# Patient Record
Sex: MALE | Race: WHITE | Employment: FULL TIME | URBAN - METROPOLITAN AREA
[De-identification: names, ages, dates, MRNs, and addresses within clinical notes are randomized per-mention and may not be internally consistent; named-entity substitution may affect disease eponyms.]

---

## 2017-09-20 ENCOUNTER — OFFICE VISIT (OUTPATIENT)
Dept: FAMILY MEDICINE CLINIC | Age: 47
End: 2017-09-20

## 2017-09-20 VITALS
HEART RATE: 78 BPM | SYSTOLIC BLOOD PRESSURE: 106 MMHG | DIASTOLIC BLOOD PRESSURE: 74 MMHG | TEMPERATURE: 97.9 F | HEIGHT: 67 IN | RESPIRATION RATE: 16 BRPM | BODY MASS INDEX: 20.09 KG/M2 | WEIGHT: 128 LBS

## 2017-09-20 DIAGNOSIS — S39.012A BACK STRAIN, INITIAL ENCOUNTER: Primary | ICD-10-CM

## 2017-09-20 PROCEDURE — G8420 CALC BMI NORM PARAMETERS: HCPCS | Performed by: FAMILY MEDICINE

## 2017-09-20 PROCEDURE — G8427 DOCREV CUR MEDS BY ELIG CLIN: HCPCS | Performed by: FAMILY MEDICINE

## 2017-09-20 PROCEDURE — 99213 OFFICE O/P EST LOW 20 MIN: CPT | Performed by: FAMILY MEDICINE

## 2017-09-20 PROCEDURE — 4004F PT TOBACCO SCREEN RCVD TLK: CPT | Performed by: FAMILY MEDICINE

## 2017-09-20 RX ORDER — CYCLOBENZAPRINE HCL 10 MG
TABLET ORAL
Qty: 30 TABLET | Refills: 1 | Status: SHIPPED | OUTPATIENT
Start: 2017-09-20

## 2017-09-20 RX ORDER — HYDROCODONE BITARTRATE AND ACETAMINOPHEN 5; 325 MG/1; MG/1
1 TABLET ORAL EVERY 4 HOURS PRN
Qty: 40 TABLET | Refills: 0 | Status: SHIPPED | OUTPATIENT
Start: 2017-09-20 | End: 2017-09-27

## 2017-09-20 RX ORDER — METHYLPREDNISOLONE 4 MG/1
TABLET ORAL
Qty: 1 KIT | Refills: 0 | Status: SHIPPED | OUTPATIENT
Start: 2017-09-20 | End: 2017-09-26

## 2017-09-20 ASSESSMENT — PATIENT HEALTH QUESTIONNAIRE - PHQ9
1. LITTLE INTEREST OR PLEASURE IN DOING THINGS: 0
2. FEELING DOWN, DEPRESSED OR HOPELESS: 0
SUM OF ALL RESPONSES TO PHQ9 QUESTIONS 1 & 2: 0
SUM OF ALL RESPONSES TO PHQ QUESTIONS 1-9: 0

## 2018-04-12 ENCOUNTER — OFFICE VISIT (OUTPATIENT)
Dept: FAMILY MEDICINE CLINIC | Age: 48
End: 2018-04-12
Payer: COMMERCIAL

## 2018-04-12 VITALS
TEMPERATURE: 97.6 F | WEIGHT: 128 LBS | RESPIRATION RATE: 20 BRPM | HEIGHT: 67 IN | SYSTOLIC BLOOD PRESSURE: 108 MMHG | DIASTOLIC BLOOD PRESSURE: 72 MMHG | HEART RATE: 65 BPM | BODY MASS INDEX: 20.09 KG/M2

## 2018-04-12 DIAGNOSIS — Z00.00 ANNUAL PHYSICAL EXAM: ICD-10-CM

## 2018-04-12 DIAGNOSIS — K21.00 GASTROESOPHAGEAL REFLUX DISEASE WITH ESOPHAGITIS: ICD-10-CM

## 2018-04-12 DIAGNOSIS — Z00.00 ANNUAL PHYSICAL EXAM: Primary | ICD-10-CM

## 2018-04-12 LAB
ALBUMIN SERPL-MCNC: 4.5 G/DL (ref 3.9–4.9)
ALP BLD-CCNC: 97 U/L (ref 35–104)
ALT SERPL-CCNC: 14 U/L (ref 0–41)
ANION GAP SERPL CALCULATED.3IONS-SCNC: 13 MEQ/L (ref 7–13)
AST SERPL-CCNC: 22 U/L (ref 0–40)
BASOPHILS ABSOLUTE: 0 K/UL (ref 0–0.2)
BASOPHILS RELATIVE PERCENT: 0.6 %
BILIRUB SERPL-MCNC: 0.2 MG/DL (ref 0–1.2)
BUN BLDV-MCNC: 16 MG/DL (ref 6–20)
CALCIUM SERPL-MCNC: 9.3 MG/DL (ref 8.6–10.2)
CHLORIDE BLD-SCNC: 102 MEQ/L (ref 98–107)
CHOLESTEROL, TOTAL: 185 MG/DL (ref 0–199)
CO2: 27 MEQ/L (ref 22–29)
CREAT SERPL-MCNC: 0.96 MG/DL (ref 0.7–1.2)
EOSINOPHILS ABSOLUTE: 0.1 K/UL (ref 0–0.7)
EOSINOPHILS RELATIVE PERCENT: 1.5 %
GFR AFRICAN AMERICAN: >60
GFR NON-AFRICAN AMERICAN: >60
GLOBULIN: 2.5 G/DL (ref 2.3–3.5)
GLUCOSE BLD-MCNC: 92 MG/DL (ref 74–109)
HCT VFR BLD CALC: 40.7 % (ref 42–52)
HDLC SERPL-MCNC: 62 MG/DL (ref 40–59)
HEMOGLOBIN: 13.8 G/DL (ref 14–18)
LDL CHOLESTEROL CALCULATED: 107 MG/DL (ref 0–129)
LYMPHOCYTES ABSOLUTE: 2.1 K/UL (ref 1–4.8)
LYMPHOCYTES RELATIVE PERCENT: 31.7 %
MCH RBC QN AUTO: 32.1 PG (ref 27–31.3)
MCHC RBC AUTO-ENTMCNC: 33.8 % (ref 33–37)
MCV RBC AUTO: 95.1 FL (ref 80–100)
MONOCYTES ABSOLUTE: 0.7 K/UL (ref 0.2–0.8)
MONOCYTES RELATIVE PERCENT: 10.2 %
NEUTROPHILS ABSOLUTE: 3.7 K/UL (ref 1.4–6.5)
NEUTROPHILS RELATIVE PERCENT: 56 %
PDW BLD-RTO: 13.6 % (ref 11.5–14.5)
PLATELET # BLD: 288 K/UL (ref 130–400)
POTASSIUM SERPL-SCNC: 4.2 MEQ/L (ref 3.5–5.1)
RBC # BLD: 4.28 M/UL (ref 4.7–6.1)
SODIUM BLD-SCNC: 142 MEQ/L (ref 132–144)
TOTAL PROTEIN: 7 G/DL (ref 6.4–8.1)
TRIGL SERPL-MCNC: 79 MG/DL (ref 0–200)
WBC # BLD: 6.5 K/UL (ref 4.8–10.8)

## 2018-04-12 PROCEDURE — 99396 PREV VISIT EST AGE 40-64: CPT | Performed by: FAMILY MEDICINE

## 2018-04-12 RX ORDER — NICOTINE 21 MG/24HR
1 PATCH, TRANSDERMAL 24 HOURS TRANSDERMAL EVERY 24 HOURS
Qty: 30 PATCH | Refills: 1 | Status: SHIPPED | OUTPATIENT
Start: 2018-04-12 | End: 2019-04-12

## 2018-07-19 ENCOUNTER — OFFICE VISIT (OUTPATIENT)
Dept: FAMILY MEDICINE CLINIC | Age: 48
End: 2018-07-19
Payer: COMMERCIAL

## 2018-07-19 VITALS
HEIGHT: 67 IN | BODY MASS INDEX: 19.46 KG/M2 | RESPIRATION RATE: 16 BRPM | SYSTOLIC BLOOD PRESSURE: 108 MMHG | HEART RATE: 78 BPM | OXYGEN SATURATION: 94 % | TEMPERATURE: 96.3 F | DIASTOLIC BLOOD PRESSURE: 62 MMHG | WEIGHT: 124 LBS

## 2018-07-19 DIAGNOSIS — R05.9 COUGH: Primary | ICD-10-CM

## 2018-07-19 PROCEDURE — G8420 CALC BMI NORM PARAMETERS: HCPCS | Performed by: NURSE PRACTITIONER

## 2018-07-19 PROCEDURE — G8427 DOCREV CUR MEDS BY ELIG CLIN: HCPCS | Performed by: NURSE PRACTITIONER

## 2018-07-19 PROCEDURE — 4004F PT TOBACCO SCREEN RCVD TLK: CPT | Performed by: NURSE PRACTITIONER

## 2018-07-19 PROCEDURE — 99213 OFFICE O/P EST LOW 20 MIN: CPT | Performed by: NURSE PRACTITIONER

## 2018-07-19 RX ORDER — BENZONATATE 100 MG/1
100 CAPSULE ORAL 3 TIMES DAILY PRN
Qty: 30 CAPSULE | Refills: 0 | Status: SHIPPED | OUTPATIENT
Start: 2018-07-19

## 2018-07-19 ASSESSMENT — ENCOUNTER SYMPTOMS
SHORTNESS OF BREATH: 0
WHEEZING: 0
DIARRHEA: 0
COUGH: 1
EYE REDNESS: 0
SINUS PRESSURE: 0
SORE THROAT: 0
NAUSEA: 0
EYE DISCHARGE: 0
SINUS PAIN: 0
EYE PAIN: 0
RHINORRHEA: 0
TROUBLE SWALLOWING: 0
CONSTIPATION: 0
EYE ITCHING: 0
CHEST TIGHTNESS: 0
VOMITING: 0

## 2018-07-19 NOTE — PROGRESS NOTES
Subjective  Office Visit  [address] [facility]  [place] [address]  Dept: [phone]  Dept Fax: [phone]  Loc: [address]  YOB: 1970  Age: 53 y.o. Sex: male  Date of Assessment:  7/19/2018  PCP: Jared Calderon DO    Chief Complaint   Patient presents with    Cough     x 2 day       HPI    C/C:Presents for evaluation of cough with phlegm production. Patient has had no known ill contacts. Relevant PMH: Asthma. Patient denies travel or residence in the [facility]. Patient denies travel or residence in [address] [place] [address] for TB. Patient denies occupational or hobby exposure to irritants such as sulfiting agents, tartrazine, epoxies, chemical, flour, wood dust.)     Onset of symptoms was 2 days ago     Denies: fever, myalgias/arthralgias, headache, ear symptoms, shortness of breath, wheezing/chest tightness, dizziness, eye symptoms, nausea/vomiting and diarrhea, history of TMJ. Symptoms are: gradually worsening since that time. Treatment to date: nothing, which has been  na. Symptoms are alleviated by nothing    Symptoms are aggravated by nothing.     Vitals    /62   Pulse 78   Temp 96.3 °F (35.7 °C) (Temporal)   Resp 16   Ht 5' 7\" (1.702 m)   Wt 124 lb (56.2 kg)   SpO2 94%   BMI 19.42 kg/m²     BP Readings from Last 3 Encounters:   07/19/18 108/62   04/12/18 108/72   09/20/17 106/74      Wt Readings from Last 3 Encounters:   07/19/18 124 lb (56.2 kg)   04/12/18 128 lb (58.1 kg)   09/20/17 128 lb (58.1 kg)          Personal and Family History    Past Medical History:   Diagnosis Date    Allergic rhinitis     Asthma     Colitis     GERD (gastroesophageal reflux disease)     Rosacea        Past Surgical History:   Procedure Laterality Date    COLONOSCOPY  2004    UPPER GASTROINTESTINAL ENDOSCOPY  2004       Family History   Problem Relation Age of Onset    High Blood Pressure Mother    [facility] Return if symptoms worsen or fail to improve. Antibiotics: To prevent antibiotic resistances please take medication as ordered and for the full duration even if you start to feel better. Avoid Alcohol      Supportive Measures  Wash hands frequently, Tylenol or Ibuprofen for discomfort or fever. For sore throat use warm salt water gargles, cough drops or chloraseptic spray. For cough add humidification to the air for dry environments. Sit in a steam shower. Add pillows underneath the mattress to help sleep with head of bed elevated decreasing night time cough and shortness of breath cause by post nasal drip. Advised to increase fluid intake and rest as tolerated. Monitor for signs of dehydration which can include dry mucous membranes, decreased urine output, sunken dark eyes. Stop smoking and avoid second hand smoke    OTC Medication:    Warning:  Use any OTC product with caution and with careful consideration of comorbidities such as diabetes and hypertension. Decongestants:    Patient was advised to avoid using medications with the initials D or DM such as Zyrtec-D for more than 4 days. After 4 days patient, should then transition to regular Zyrtec for the management of seasonal allergies. Also, avoid long term use of things such as pseudoephedrine and Afrin as they can worsen symptoms and become addictive. Patient was instructed that she should NOT be taking antihistamines with pseudoephedrine long-term. Nasal Sprays  Patient was instructed nasal sprays. Avoid with history of hypertension. Prior to using nasal medication blow nose. Advised patient to keep nose over toes to avoid foul taste, breathe out slowly squeeze the pump or press down canister as you slowly breathe through your nose use left had to spray right nares, use right hand to spray left nares, spray toward outer nares versus the septum to avoid irritation that can cause nose bleeds.  Avoid sneezing if possible or blowing

## 2023-03-06 LAB
C REACTIVE PROTEIN (MG/L) IN SER/PLAS: 0.14 MG/DL
C-REACTIVE PROTEIN: 0.14 MG/DL
DEAMIDATED GLIADIN PEPTIDE IGA: 2 U/ML (ref 0–14)
DEAMIDATED GLIADIN PEPTIDE IGG: <1 U/ML (ref 0–14)
ERYTHROCYTE DISTRIBUTION WIDTH (RATIO) BY AUTOMATED COUNT: 13.2 % (ref 11.5–14.5)
ERYTHROCYTE MEAN CORPUSCULAR HEMOGLOBIN CONCENTRATION (G/DL) BY AUTOMATED: 31.8 G/DL (ref 32–36)
ERYTHROCYTE MEAN CORPUSCULAR VOLUME (FL) BY AUTOMATED COUNT: 95 FL (ref 80–100)
ERYTHROCYTE [DISTWIDTH] IN BLOOD BY AUTOMATED COUNT: 13.2 % (ref 11.5–14)
ERYTHROCYTES (10*6/UL) IN BLOOD BY AUTOMATED COUNT: 4.18 X10E12/L (ref 4.5–5.9)
GLIADIN ANTIBODIES IGA: 2 U/ML (ref 0–14)
GLIADIN ANTIBODIES IGG: <1 U/ML (ref 0–14)
HCT VFR BLD CALC: 39.9 % (ref 41–52)
HEMATOCRIT (%) IN BLOOD BY AUTOMATED COUNT: 39.9 % (ref 41–52)
HEMOGLOBIN (G/DL) IN BLOOD: 12.7 G/DL (ref 13.5–17.5)
HEMOGLOBIN: 12.7 G/DL (ref 13.5–17)
LEUKOCYTES (10*3/UL) IN BLOOD BY AUTOMATED COUNT: 8.3 X10E9/L (ref 4.4–11.3)
MCHC RBC AUTO-ENTMCNC: 31.8 G/DL (ref 32–36)
MCV RBC AUTO: 95 FL (ref 80–100)
PLATELET # BLD: 553 X10E9/L (ref 150–450)
PLATELETS (10*3/UL) IN BLOOD AUTOMATED COUNT: 553 X10E9/L (ref 150–450)
RBC # BLD: 4.18 X10E12/L (ref 4.5–5.9)
SEDIMENTATION RATE, ERYTHROCYTE: 44 MM/H (ref 0–20)
SEDIMENTATION RATE, ERYTHROCYTE: 44 MM/H (ref 0–20)
TISSUE TRANSGLUTAMINASE ANTIBODY IGG: <1 U/ML (ref 0–14)
TISSUE TRANSGLUTAMINASE IGA: <1 U/ML (ref 0–14)
TISSUE TRANSGLUTAMINASE IGG: <1 U/ML (ref 0–14)
TISSUE TRANSGLUTAMINASE, IGA: <1 U/ML (ref 0–14)
WBC: 8.3 X10E9/L (ref 4.4–11.3)

## 2023-08-01 LAB — CARCINOEMBRYONIC AG (NG/ML) IN SER/PLAS: 1.4 UG/L

## 2023-08-22 LAB
ALANINE AMINOTRANSFERASE (SGPT) (U/L) IN SER/PLAS: 11 U/L (ref 10–52)
ALBUMIN (G/DL) IN SER/PLAS: 4.2 G/DL (ref 3.4–5)
ALKALINE PHOSPHATASE (U/L) IN SER/PLAS: 85 U/L (ref 33–120)
ANION GAP IN SER/PLAS: 10 MMOL/L (ref 10–20)
ASPARTATE AMINOTRANSFERASE (SGOT) (U/L) IN SER/PLAS: 14 U/L (ref 9–39)
BILIRUBIN TOTAL (MG/DL) IN SER/PLAS: 0.3 MG/DL (ref 0–1.2)
CALCIUM (MG/DL) IN SER/PLAS: 9.1 MG/DL (ref 8.6–10.3)
CARBON DIOXIDE, TOTAL (MMOL/L) IN SER/PLAS: 29 MMOL/L (ref 21–32)
CHLORIDE (MMOL/L) IN SER/PLAS: 108 MMOL/L (ref 98–107)
CREATININE (MG/DL) IN SER/PLAS: 0.96 MG/DL (ref 0.5–1.3)
GFR MALE: >90 ML/MIN/1.73M2
GLUCOSE (MG/DL) IN SER/PLAS: 93 MG/DL (ref 74–99)
POTASSIUM (MMOL/L) IN SER/PLAS: 4 MMOL/L (ref 3.5–5.3)
PROTEIN TOTAL: 7.2 G/DL (ref 6.4–8.2)
SODIUM (MMOL/L) IN SER/PLAS: 143 MMOL/L (ref 136–145)
UREA NITROGEN (MG/DL) IN SER/PLAS: 19 MG/DL (ref 6–23)

## 2023-10-27 PROBLEM — Z00.00 ROUTINE GENERAL MEDICAL EXAMINATION AT A HEALTH CARE FACILITY: Status: ACTIVE | Noted: 2023-10-27

## 2023-10-27 PROBLEM — R10.9 ABDOMINAL PAIN: Status: ACTIVE | Noted: 2023-10-27

## 2023-10-27 PROBLEM — L01.00 IMPETIGO: Status: ACTIVE | Noted: 2023-10-27

## 2023-10-27 PROBLEM — J01.90 ACUTE SINUSITIS: Status: ACTIVE | Noted: 2023-10-27

## 2023-10-27 NOTE — PROGRESS NOTES
"Subjective   Patient ID: Mitch Felder is a 53 y.o. male who presents for Annual Exam.  HPI  Annual physical   Eats a generally healthy diet   active  Denies any chest pain,SOB  No Abdominal pain   No black or bloody stools   Urination/BM normal   Last eye apt 3 years ago  Last dental apt 1 years ago  No new family h/o cancers or heart disease   Did not fast today for BW    Pt declined flu vaccine today    No other concern    Review of systems  ; Patient seen today for exam denies any problems with headaches or vision, denies any shortness of breath chest pain nausea or vomiting, no black stool no blood in the stool no heartburn type symptoms denies any problems with constipation or diarrhea, and no dysuria-type symptoms    The patient's allergies medications were reviewed with them today    The patient's social family and surgical history or also reviewed here today, along with her past medical history.     Objective     Alert and active in  no acute distress  HEENT TMs clear oropharynx negative nares clear no drainage noted neck supple  With no adenopathy   Heart regular rate and rhythm without murmur and no carotid bruits  Lungs- clear to auscultation bilaterally, no wheeze or rhonchi noted  Thyroid -negative masses or nodularity  Abdomen- soft times four quadrants , bowel sounds positive no masses or organomegaly, negative tenderness guarding or rebound  Neurological exam unremarkable- DTRs in upper and lower extremities within normal limits.   skin -no lesions noted      BP 94/58 (BP Location: Left arm, Patient Position: Sitting, BP Cuff Size: Adult)   Pulse 55   Temp 36.4 °C (97.5 °F) (Temporal)   Resp 16   Ht 1.702 m (5' 7\")   Wt 57.1 kg (125 lb 12.8 oz)   SpO2 98%   BMI 19.70 kg/m²     No Known Allergies    Assessment/Plan   Problem List Items Addressed This Visit       Routine general medical examination at a health care facility    Relevant Orders    Lipid Panel    BMI 20.0-20.9, adult     Other " Visit Diagnoses       Prostate cancer screening        Relevant Orders    Prostate Specific Antigen, Screen          Doing well reviewed his pathology report showing colitis he is due to see Dr. Raymond over next few weeks    Have to keep an eye on things if he has any other bouts of colitis he will need a also colitis work-up at that time with biopsies will also far nothing really showing its self      We will get his prostate and cholesterol as ordered    Discussed shingles vaccine  Deferred    If anything worsens or changes please call us at once, follow up in the office as planned,

## 2023-10-30 ENCOUNTER — OFFICE VISIT (OUTPATIENT)
Dept: PRIMARY CARE | Facility: CLINIC | Age: 53
End: 2023-10-30
Payer: COMMERCIAL

## 2023-10-30 VITALS
WEIGHT: 125.8 LBS | OXYGEN SATURATION: 98 % | SYSTOLIC BLOOD PRESSURE: 94 MMHG | HEIGHT: 67 IN | HEART RATE: 55 BPM | BODY MASS INDEX: 19.74 KG/M2 | RESPIRATION RATE: 16 BRPM | DIASTOLIC BLOOD PRESSURE: 58 MMHG | TEMPERATURE: 97.5 F

## 2023-10-30 DIAGNOSIS — Z12.5 PROSTATE CANCER SCREENING: ICD-10-CM

## 2023-10-30 DIAGNOSIS — Z00.00 ROUTINE GENERAL MEDICAL EXAMINATION AT A HEALTH CARE FACILITY: ICD-10-CM

## 2023-10-30 PROCEDURE — 99396 PREV VISIT EST AGE 40-64: CPT | Performed by: FAMILY MEDICINE

## 2023-10-30 PROCEDURE — 1036F TOBACCO NON-USER: CPT | Performed by: FAMILY MEDICINE

## 2023-10-30 PROCEDURE — 3008F BODY MASS INDEX DOCD: CPT | Performed by: FAMILY MEDICINE

## 2023-10-30 ASSESSMENT — PATIENT HEALTH QUESTIONNAIRE - PHQ9
2. FEELING DOWN, DEPRESSED OR HOPELESS: NOT AT ALL
1. LITTLE INTEREST OR PLEASURE IN DOING THINGS: NOT AT ALL
SUM OF ALL RESPONSES TO PHQ9 QUESTIONS 1 AND 2: 0

## 2023-11-16 ENCOUNTER — OFFICE VISIT (OUTPATIENT)
Dept: GASTROENTEROLOGY | Facility: CLINIC | Age: 53
End: 2023-11-16
Payer: COMMERCIAL

## 2023-11-16 ENCOUNTER — LAB (OUTPATIENT)
Dept: LAB | Facility: LAB | Age: 53
End: 2023-11-16
Payer: COMMERCIAL

## 2023-11-16 VITALS
HEART RATE: 60 BPM | HEIGHT: 67 IN | SYSTOLIC BLOOD PRESSURE: 112 MMHG | BODY MASS INDEX: 19.93 KG/M2 | WEIGHT: 127 LBS | DIASTOLIC BLOOD PRESSURE: 63 MMHG

## 2023-11-16 DIAGNOSIS — K56.699 COLON STRICTURE (MULTI): ICD-10-CM

## 2023-11-16 DIAGNOSIS — K56.699 COLON STRICTURE (MULTI): Primary | ICD-10-CM

## 2023-11-16 DIAGNOSIS — Z00.00 ROUTINE GENERAL MEDICAL EXAMINATION AT A HEALTH CARE FACILITY: ICD-10-CM

## 2023-11-16 DIAGNOSIS — Z12.5 PROSTATE CANCER SCREENING: ICD-10-CM

## 2023-11-16 LAB
CHOLEST SERPL-MCNC: 187 MG/DL (ref 0–199)
CHOLESTEROL/HDL RATIO: 3.2
ERYTHROCYTE [DISTWIDTH] IN BLOOD BY AUTOMATED COUNT: 13.1 % (ref 11.5–14.5)
HCT VFR BLD AUTO: 38.6 % (ref 41–52)
HDLC SERPL-MCNC: 58.4 MG/DL
HGB BLD-MCNC: 12.2 G/DL (ref 13.5–17.5)
LDLC SERPL CALC-MCNC: 107 MG/DL
MCH RBC QN AUTO: 29.5 PG (ref 26–34)
MCHC RBC AUTO-ENTMCNC: 31.6 G/DL (ref 32–36)
MCV RBC AUTO: 94 FL (ref 80–100)
NON HDL CHOLESTEROL: 129 MG/DL (ref 0–149)
NRBC BLD-RTO: 0 /100 WBCS (ref 0–0)
PLATELET # BLD AUTO: 333 X10*3/UL (ref 150–450)
PSA SERPL-MCNC: 0.29 NG/ML
RBC # BLD AUTO: 4.13 X10*6/UL (ref 4.5–5.9)
TRIGL SERPL-MCNC: 107 MG/DL (ref 0–149)
VLDL: 21 MG/DL (ref 0–40)
WBC # BLD AUTO: 6.1 X10*3/UL (ref 4.4–11.3)

## 2023-11-16 PROCEDURE — 99214 OFFICE O/P EST MOD 30 MIN: CPT | Performed by: INTERNAL MEDICINE

## 2023-11-16 PROCEDURE — 84153 ASSAY OF PSA TOTAL: CPT

## 2023-11-16 PROCEDURE — 80061 LIPID PANEL: CPT

## 2023-11-16 PROCEDURE — 3008F BODY MASS INDEX DOCD: CPT | Performed by: INTERNAL MEDICINE

## 2023-11-16 PROCEDURE — 1036F TOBACCO NON-USER: CPT | Performed by: INTERNAL MEDICINE

## 2023-11-16 PROCEDURE — 83993 ASSAY FOR CALPROTECTIN FECAL: CPT

## 2023-11-16 PROCEDURE — 85027 COMPLETE CBC AUTOMATED: CPT

## 2023-11-16 PROCEDURE — 36415 COLL VENOUS BLD VENIPUNCTURE: CPT

## 2023-11-16 NOTE — PROGRESS NOTES
Gastroenterology Office Visit     History of Present Illness:   Mitch Felder is a 53 y.o. male who presents to GI clinic for follow up of transverse colon stricture.  Since last OV in 7/23 with Rashida Lynch, patient has had surgery with Dr. Raymond in 8/23: Laparoscopic extended right  hemicolectomy, stapled side-to-side ileocolic anastomosis, and mobilization of splenic flexure.  Path showed CAC with fibrosis and stricture, but no cancer; pathologist favored IBD over ischemia.      He was having L-sided abdominal pain, diarrhea and bleeding when he was first seen by Ms. Lynch.  Pain, diarrhea, and bleeding have resolved after surgery.       OV in 7/23:  Patient following up to review test results.  This case was reviewed with Dr. Chapman, collaborating physician.  Patient initially was sent with Dr. Cardenas for a colonoscopy due to abnormal CT scan findings at [facility].  ->Colonoscopy 3/21/2023: Stricture in the descending colon. Biopsied. Patchy moderate inflammation found in the rectum and in the sigmoid colon. Biopsied. The distal rectum and anal verge are normal on retroflexion view. Repeat colonoscopy in 4 months to evaluate response to therapy.  Pathology returned positive for active chronic inflammation.  -> He was placed on a prednisone taper shortly after his colonoscopy/pathology results.  -> Repeat colonoscopy With Dr. Chapman 7/10/2023: 10 mm polyp in the sigmoid colon, removed with cold snare. Stricture at 80 cm proximal to the anus. Tattooed. Pseudopolyps in the proximal sigmoid colon and in the descending colon. Nonbleeding IH.  Pathology unremarkable  ->Barium enema 7/21/2023: Severe stricture of the transverse colon around the splenic flexure. No evidence of bowel leak.     Patient denies constipation or diarrhea. No abdominal pain.  He has changed his diet. Eating more fiber and avoiding processed foods. Drinking more water throughout the day  Weight and appetite are stable per the patient.  He denies  melena, hematochezia or hematemesis.  No family history of CRC or IBD    Review of Systems  Constitutional: denies fever/ chills, night sweats, wt loss and fatigue  Respiratory: denies SOB, GAGNON  CV: denies chest pain and LE edema  Neuro: denies weakness and difficulty walking      Past Medical History   has a past medical history of Other conditions influencing health status and Rash and other nonspecific skin eruption.     Problem List  Patient Active Problem List   Diagnosis    Abdominal pain    Acute sinusitis    Impetigo    Routine general medical examination at a health care facility    BMI 20.0-20.9, adult    Colon stricture (CMS/HCC)       Past Surgical History  Past Surgical History:   Procedure Laterality Date    OTHER SURGICAL HISTORY  07/05/2019    No history of surgery       Social History   reports that he has never smoked. He has never used smokeless tobacco. He reports that he does not currently use alcohol. He reports that he does not use drugs.     Family History  family history includes Heart disease in his father; No Known Problems in his mother.       Allergies  No Known Allergies    Medications  No current outpatient medications     Objective   Visit Vitals  /63 (BP Location: Left arm, Patient Position: Sitting, BP Cuff Size: Adult)   Pulse 60          LABS   7/8/2020 3/6/2023 8/29/2023 8/30/2023 8/31/2023   WBC 7.5  8.3  9.0  5.8  5.9    nRBC 0.0   0.0  0.0  0.0    RBC 4.14 (L)  4.18 (L)  3.77 (L)  3.42 (L)  3.60 (L)    HEMOGLOBIN 13.2 (L)  12.7 (L)  11.1 (L)  10.1 (L)  10.7 (L)    HEMATOCRIT 42.2  39.9 (L)  34.9 (L)  31.2 (L)  33.2 (L)     (H)  95  93  91  92    MCHC 31.3 (L)  31.8 (L)  31.8 (L)  32.4  32.2    Platelets 365  553 (H)  325  283  314         Radiology  BE as above    Endoscopy    As above    Assessment/Plan   Mitch Felder is a 53 y.o. male who presents to GI clinic for colonic stricture, s/p lap extended R hemicolectomy in 8/23, and possible IBD as the  etiology.        Colon stricture (CMS/HCC)  Unclear etiology, ?IBD vs ischemic.  Check CBC, calprotectin, and flex sig with bx. Return to clinic in 2 months.        Joesph Chapman MD

## 2023-11-16 NOTE — ASSESSMENT & PLAN NOTE
Unclear etiology, ?IBD.  Check CBC, calprotectin, and flex sig with bx. Return to clinic in 2 months.

## 2023-11-18 LAB — CALPROTECTIN STL-MCNT: 117 UG/G

## 2023-11-27 ENCOUNTER — ANESTHESIA (OUTPATIENT)
Dept: GASTROENTEROLOGY | Facility: EXTERNAL LOCATION | Age: 53
End: 2023-11-27

## 2023-11-27 ENCOUNTER — ANESTHESIA EVENT (OUTPATIENT)
Dept: GASTROENTEROLOGY | Facility: EXTERNAL LOCATION | Age: 53
End: 2023-11-27

## 2023-11-27 ENCOUNTER — HOSPITAL ENCOUNTER (OUTPATIENT)
Dept: GASTROENTEROLOGY | Facility: EXTERNAL LOCATION | Age: 53
Discharge: HOME | End: 2023-11-27
Payer: COMMERCIAL

## 2023-11-27 VITALS
HEART RATE: 62 BPM | SYSTOLIC BLOOD PRESSURE: 123 MMHG | HEIGHT: 67 IN | OXYGEN SATURATION: 100 % | TEMPERATURE: 97.7 F | BODY MASS INDEX: 20.4 KG/M2 | WEIGHT: 130 LBS | RESPIRATION RATE: 16 BRPM | DIASTOLIC BLOOD PRESSURE: 69 MMHG

## 2023-11-27 DIAGNOSIS — K56.699 COLON STRICTURE (MULTI): ICD-10-CM

## 2023-11-27 PROCEDURE — 88305 TISSUE EXAM BY PATHOLOGIST: CPT | Mod: TC,SUR,ELYLAB | Performed by: INTERNAL MEDICINE

## 2023-11-27 PROCEDURE — 88305 TISSUE EXAM BY PATHOLOGIST: CPT | Performed by: PATHOLOGY

## 2023-11-27 RX ORDER — ONDANSETRON HYDROCHLORIDE 2 MG/ML
4 INJECTION, SOLUTION INTRAVENOUS ONCE AS NEEDED
Status: DISCONTINUED | OUTPATIENT
Start: 2023-11-27 | End: 2023-11-28 | Stop reason: HOSPADM

## 2023-11-27 RX ORDER — SODIUM CHLORIDE 9 MG/ML
20 INJECTION, SOLUTION INTRAVENOUS CONTINUOUS
Status: DISCONTINUED | OUTPATIENT
Start: 2023-11-27 | End: 2023-11-28 | Stop reason: HOSPADM

## 2023-11-27 RX ORDER — LIDOCAINE HYDROCHLORIDE 20 MG/ML
INJECTION, SOLUTION EPIDURAL; INFILTRATION; INTRACAUDAL; PERINEURAL AS NEEDED
Status: DISCONTINUED | OUTPATIENT
Start: 2023-11-27 | End: 2023-11-27

## 2023-11-27 RX ORDER — PROPOFOL 10 MG/ML
INJECTION, EMULSION INTRAVENOUS AS NEEDED
Status: DISCONTINUED | OUTPATIENT
Start: 2023-11-27 | End: 2023-11-27

## 2023-11-27 RX ADMIN — PROPOFOL 100 MG: 10 INJECTION, EMULSION INTRAVENOUS at 11:02

## 2023-11-27 RX ADMIN — SODIUM CHLORIDE: 9 INJECTION, SOLUTION INTRAVENOUS at 11:02

## 2023-11-27 RX ADMIN — PROPOFOL 50 MG: 10 INJECTION, EMULSION INTRAVENOUS at 11:10

## 2023-11-27 RX ADMIN — LIDOCAINE HYDROCHLORIDE 50 MG: 20 INJECTION, SOLUTION EPIDURAL; INFILTRATION; INTRACAUDAL; PERINEURAL at 11:02

## 2023-11-27 SDOH — HEALTH STABILITY: MENTAL HEALTH: CURRENT SMOKER: 1

## 2023-11-27 ASSESSMENT — PAIN SCALES - GENERAL
PAINLEVEL_OUTOF10: 0 - NO PAIN
PAIN_LEVEL: 0

## 2023-11-27 ASSESSMENT — COLUMBIA-SUICIDE SEVERITY RATING SCALE - C-SSRS
6. HAVE YOU EVER DONE ANYTHING, STARTED TO DO ANYTHING, OR PREPARED TO DO ANYTHING TO END YOUR LIFE?: NO
1. IN THE PAST MONTH, HAVE YOU WISHED YOU WERE DEAD OR WISHED YOU COULD GO TO SLEEP AND NOT WAKE UP?: NO
2. HAVE YOU ACTUALLY HAD ANY THOUGHTS OF KILLING YOURSELF?: NO

## 2023-11-27 ASSESSMENT — PAIN - FUNCTIONAL ASSESSMENT: PAIN_FUNCTIONAL_ASSESSMENT: 0-10

## 2023-11-27 NOTE — ANESTHESIA POSTPROCEDURE EVALUATION
Patient: Mitch Felder    Procedure Summary       Date: 11/27/23 Room / Location: [facility]    Anesthesia Start:  Anesthesia Stop:     Procedure: FLEXIBLE SIGMOIDOSCOPY Diagnosis: Colon stricture (CMS/HCC)    Scheduled Providers: Joesph Chpaman MD Responsible Provider: MINERVA Valenzuela    Anesthesia Type: MAC ASA Status: 1            Anesthesia Type: MAC    Vitals Value Taken Time   BP 94/59 11/27/23 1117   Temp 36.5 °C (97.7 °F) 11/27/23 1117   Pulse 58 11/27/23 1117   Resp 14 11/27/23 1117   SpO2 100 % 11/27/23 1117       Anesthesia Post Evaluation    Patient location during evaluation: bedside  Patient participation: complete - patient cannot participate  Level of consciousness: awake and responsive to verbal stimuli  Pain score: 0  Pain management: adequate  Airway patency: patent  Cardiovascular status: acceptable and hemodynamically stable  Respiratory status: acceptable  Hydration status: acceptable  Postoperative Nausea and Vomiting: none        No notable events documented.

## 2023-11-27 NOTE — ANESTHESIA PREPROCEDURE EVALUATION
Patient: Mitch Felder    Procedure Information       Date/Time: 11/27/23 1100    Scheduled providers: Joesph Chapman MD    Procedure: FLEXIBLE SIGMOIDOSCOPY    Location: [facility]            Relevant Problems   Infectious Disease   (+) Impetigo       Clinical information reviewed:    Allergies                NPO Detail:  No data recorded     Physical Exam    Airway  Mallampati: II  TM distance: >3 FB  Neck ROM: full     Cardiovascular - normal exam     Dental - normal exam     Pulmonary - normal exam  Breath sounds clear to auscultation     Abdominal            Anesthesia Plan    ASA 1     MAC     The patient is a current smoker.  Patient was previously instructed to abstain from smoking on day of procedure.  Patient did not smoke on day of procedure.    intravenous induction   Anesthetic plan and risks discussed with patient.  Use of blood products discussed with patient who.    Plan discussed with CRNA.

## 2023-11-27 NOTE — DISCHARGE INSTRUCTIONS
Patient Instructions Post Procedure      The anesthetics, sedatives or narcotics which were given to you today will be acting in your body for the next 24 hours, so you might feel a little sleepy or groggy.  This feeling should slowly wear off. Carefully read and follow the instructions.     You received sedation today:  - Do not drive or operate any machinery or power tools of any kind.   - No alcoholic beverages today, not even beer or wine.  - Do not make any important decisions or sign any legal documents.  - No over the counter medications that contain alcohol or that may cause drowsiness.    While it is common to experience mild to moderate abdominal distention, gas, or belching after your procedure, if any of these symptoms occur following discharge from the GI Lab or within one week of having your procedure, call the [facility] to be advised whether a visit to your nearest Urgent Care or Emergency Department is indicated.  Take this paper with you if you go.   - If you develop an allergic reaction to the medications that were given during your procedure such as difficulty breathing, rash, hives, severe nausea, vomiting or lightheadedness.  - If you experience chest pain, shortness of breath, severe abdominal pain, fevers and chills.  -If you develop signs and symptoms of bleeding such as blood in your spit, if your stools turn black, tarry, or bloody  - If you have not urinated within 8 hours following your procedure.  - If your IV site becomes painful, red, inflamed, or looks infected.    If you received a biopsy/polypectomy/sphincterotomy the following instructions apply below:  __ Do not use Aspirin containing products, non-steroidal medications or anti-coagulants for one week following your procedure. (Examples of these types of medications are: Advil, Arthrotec, Aleve, Coumadin, Ecotrin, Heparin, Ibuprofen, Indocin, Motrin, Naprosyn, Nuprin, Plavix, Vioxx, and Voltarin, or their generic  forms.  This list is not all-inclusive.  Check with your physician or pharmacist before resuming medications.)   __ Eat a soft diet today.  Avoid foods that are poorly digested for the next 24 hours.  These foods would include: nuts, beans, lettuce, red meats, and fried foods. Start with liquids and advance your diet as tolerated, gradually work up to eating solids.   __ Do not have a Barium Study or Enema for one week.    Your physician recommends the additional following instructions:    -You have a contact number available for emergencies. The signs and symptoms of potential delayed complications were discussed with you. You may return to normal activities tomorrow.  -Resume your previous diet or other if specified.  -Continue your present medications.   -We are waiting for your pathology results, if applicable.  -The findings and recommendations have been discussed with you and/or family.  - Please see Medication Reconciliation Form for new medication/medications prescribed.     If you experience any problems or have any questions following discharge from the GI Lab, please call: [phone] from 7 am- 4:30 pm.  In the event of an emergency please go to the closest Emergency Department or call Dr. Chapman [phone]

## 2023-11-27 NOTE — H&P
Outpatient Hospital Procedure H&P    Patient Profile-Procedures  Initial Info  Patient Demographics  Name Mitch Cragi  Date of Birth 1970  MRN [id number]  Address   [address] [place] [id number] [place] [address]    Primary Phone Number [phone]  Secondary Phone Number    PCP Hernando Marques    Procedure(s):  Flex sig  Primary contact name and number   Extended Emergency Contact Information  Primary Emergency Contact: LILO CRAIG  Mobile Phone: [phone]  Relation: Father   needed? No    General Health  Weight   Vitals:    11/27/23 1037   Weight: 59 kg (130 lb)     BMI Body mass index is 20.36 kg/m².    Allergies  No Known Allergies    Past Medical History   Past Medical History:   Diagnosis Date    Other conditions influencing health status     No significant past medical history    Rash and other nonspecific skin eruption     Rash       Provider assessment  Diagnosis: Colitis    Medication Reviewed - yes  Prior to Admission medications    Not on File       Physical Exam  Vitals:    11/27/23 1037   BP: 105/78   Pulse: 58   Resp: 10   Temp: 36.6 °C (97.9 °F)   SpO2: 100%        General: A&Ox3, NAD.  HEENT: AT/NC.   CV: RRR. No murmur.  Resp: CTA bilaterally. No wheezing, rhonchi or rales.   GI: Soft, NT/ND. BSx4.  Extrem: No edema. Pulses intact.  Skin: No Jaundice.   Neuro: No focal deficits.   Psych: Normal mood and affect.        Oropharyngeal Classification II (hard and soft palate, upper portion of tonsils anduvula visible)  ASA PS Classification 2  Sedation Plan Deep  Procedure Plan - pre-procedural (re)assesment completed by physician:  discharge/transfer patient when discharge criteria met    Joesph Chapman MD  11/27/2023 11:02 AM

## 2023-11-27 NOTE — Clinical Note
Patient tolerated the procedure well and is comfortable with no complaints of pain. Assessed abdomen soft and non distended. Vital signs stable. Arousable prior to transport. Patient transported to PACU via stretcher. Handoff completed.

## 2023-12-11 LAB
LABORATORY COMMENT REPORT: NORMAL
PATH REPORT.FINAL DX SPEC: NORMAL
PATH REPORT.GROSS SPEC: NORMAL
PATH REPORT.TOTAL CANCER: NORMAL

## 2024-01-11 ENCOUNTER — OFFICE VISIT (OUTPATIENT)
Dept: GASTROENTEROLOGY | Facility: CLINIC | Age: 54
End: 2024-01-11
Payer: COMMERCIAL

## 2024-01-11 VITALS
DIASTOLIC BLOOD PRESSURE: 62 MMHG | SYSTOLIC BLOOD PRESSURE: 103 MMHG | HEIGHT: 67 IN | WEIGHT: 130 LBS | BODY MASS INDEX: 20.4 KG/M2 | HEART RATE: 66 BPM

## 2024-01-11 DIAGNOSIS — K56.699 COLON STRICTURE (MULTI): ICD-10-CM

## 2024-01-11 PROCEDURE — 3008F BODY MASS INDEX DOCD: CPT | Performed by: INTERNAL MEDICINE

## 2024-01-11 PROCEDURE — 99213 OFFICE O/P EST LOW 20 MIN: CPT | Performed by: INTERNAL MEDICINE

## 2024-01-11 NOTE — PROGRESS NOTES
Gastroenterology Office Visit     History of Present Illness:     Since last OV in 11/23, patient has had flex sig and calprotectin; see below for complete results.    Denies abdominal pain, diarrhea, and bleeding.       OV in 11/23:  Mitch Felder is a 53 y.o. male who presents to GI clinic for follow up of transverse colon stricture.  Since last OV in 7/23 with Rashida Lynch, patient has had surgery with Dr. Raymond in 8/23: Laparoscopic extended right  hemicolectomy, stapled side-to-side ileocolic anastomosis, and mobilization of splenic flexure.  Path showed CAC with fibrosis and stricture, but no cancer; pathologist favored IBD over ischemia.      He was having L-sided abdominal pain, diarrhea and bleeding when he was first seen by Ms. Lynch.  Pain, diarrhea, and bleeding have resolved after surgery.       OV in 7/23:  Patient following up to review test results.  This case was reviewed with Dr. Chapman, collaborating physician.  Patient initially was sent with Dr. Cardenas for [facility]  ->Colonoscopy 3/21/2023: Stricture in the descending colon. Biopsied. Patchy moderate inflammation found in the rectum and in the sigmoid colon. Biopsied. The distal rectum and anal verge are normal on retroflexion view. Repeat colonoscopy in 4 months to evaluate response to therapy.  Pathology returned positive for active chronic inflammation.  -> He was placed on a prednisone taper shortly after his colonoscopy/pathology results.  -> Repeat colonoscopy With Dr. Chapman 7/10/2023: 10 mm polyp in the sigmoid colon, removed with cold snare. Stricture at 80 cm proximal to the anus. Tattooed. Pseudopolyps in the proximal sigmoid colon and in the descending colon. Nonbleeding IH.  Pathology unremarkable  ->Barium enema 7/21/2023: Severe stricture of the transverse colon around the splenic flexure. No evidence of bowel leak.     Patient denies constipation or diarrhea. No abdominal  "pain.  He has changed his diet. Eating more fiber and avoiding processed foods. Drinking more water throughout the day  Weight and appetite are stable per the patient.  He denies melena, hematochezia or hematemesis.  No family history of CRC or IBD    Review of Systems  Constitutional: denies fever/ chills, night sweats, wt loss and fatigue  Respiratory: denies SOB, GAGNON  CV: denies chest pain and LE edema  Neuro: denies weakness and difficulty walking      Past Medical History   has a past medical history of Other conditions influencing health status and Rash and other nonspecific skin eruption.     Problem List  Patient Active Problem List   Diagnosis    Abdominal pain    Acute sinusitis    Impetigo    Routine general medical examination at a health care facility    BMI 20.0-20.9, adult    Colon stricture (CMS/HCC)       Past Surgical History  Past Surgical History:   Procedure Laterality Date    OTHER SURGICAL HISTORY  07/05/2019    No history of surgery       Social History   reports that he has never smoked. He has never used smokeless tobacco. He reports that he does not currently use alcohol. He reports that he does not use drugs.     Family History  family history includes Heart disease in his father; No Known Problems in his mother.       Allergies  No Known Allergies    Medications  No current outpatient medications on file prior to visit.     No current facility-administered medications on file prior to visit.         Objective   /62   Pulse 66   Ht 1.702 m (5' 7\")   Wt 59 kg (130 lb)   BMI 20.36 kg/m²                LABS     Component      Latest Ref [facility] 11/16/2023   WBC      4.4 - 11.3 x10*3/uL 6.1    nRBC      0.0 - 0.0 /100 WBCs 0.0    RBC      4.50 - 5.90 x10*6/uL 4.13 (L)    HEMOGLOBIN      13.5 - 17.5 g/dL 12.2 (L)    HEMATOCRIT      41.0 - 52.0 % 38.6 (L)    MCV      80 - 100 fL 94    MCH      26.0 - 34.0 pg 29.5    MCHC      32.0 - 36.0 g/dL 31.6 (L)    RED CELL DISTRIBUTION WIDTH      " 11.5 - 14.5 % 13.1    Platelets      150 - 450 x10*3/uL 333           Calprotectin 11/23: 117    Radiology  BE as above    Endoscopy  Flex sig 11/23: stool prevented me from advancing to the anastomosis; rectosigmoid colon normal; no colitis on path      Assessment/Plan   Mitch Felder is a 53 y.o. male who presents to GI clinic for colonic stricture, s/p lap extended R hemicolectomy in 8/23, and possible IBD as the etiology.  Calprotectin equivocal.  No colitis on recent flex sig.       Colon stricture (CMS/HCC)  Unclear etiology, ?IBD vs ischemic.  Repeat calprotectin today.  Further recommendations to follow.  Follow up prn if normal.        Joesph Chapman MD

## 2024-01-17 ENCOUNTER — LAB (OUTPATIENT)
Dept: LAB | Facility: LAB | Age: 54
End: 2024-01-17
Payer: COMMERCIAL

## 2024-01-17 DIAGNOSIS — K56.699 COLON STRICTURE (MULTI): ICD-10-CM

## 2024-01-17 PROCEDURE — 83993 ASSAY FOR CALPROTECTIN FECAL: CPT

## 2024-01-18 ENCOUNTER — APPOINTMENT (OUTPATIENT)
Dept: GASTROENTEROLOGY | Facility: CLINIC | Age: 54
End: 2024-01-18
Payer: COMMERCIAL

## 2024-01-20 LAB — CALPROTECTIN STL-MCNT: 335 UG/G

## 2024-06-06 ENCOUNTER — OFFICE VISIT (OUTPATIENT)
Dept: PRIMARY CARE | Facility: CLINIC | Age: 54
End: 2024-06-06
Payer: COMMERCIAL

## 2024-06-06 ENCOUNTER — LAB (OUTPATIENT)
Dept: LAB | Facility: LAB | Age: 54
End: 2024-06-06
Payer: COMMERCIAL

## 2024-06-06 VITALS
BODY MASS INDEX: 21.38 KG/M2 | WEIGHT: 136.2 LBS | DIASTOLIC BLOOD PRESSURE: 64 MMHG | TEMPERATURE: 97.3 F | HEART RATE: 61 BPM | SYSTOLIC BLOOD PRESSURE: 108 MMHG | OXYGEN SATURATION: 99 % | RESPIRATION RATE: 16 BRPM | HEIGHT: 67 IN

## 2024-06-06 DIAGNOSIS — Z00.00 ROUTINE GENERAL MEDICAL EXAMINATION AT A HEALTH CARE FACILITY: ICD-10-CM

## 2024-06-06 DIAGNOSIS — Z00.00 ROUTINE GENERAL MEDICAL EXAMINATION AT A HEALTH CARE FACILITY: Primary | ICD-10-CM

## 2024-06-06 DIAGNOSIS — Z12.5 SPECIAL SCREENING FOR MALIGNANT NEOPLASM OF PROSTATE: ICD-10-CM

## 2024-06-06 LAB
ALBUMIN SERPL BCP-MCNC: 4.3 G/DL (ref 3.4–5)
ALP SERPL-CCNC: 131 U/L (ref 33–120)
ALT SERPL W P-5'-P-CCNC: 24 U/L (ref 10–52)
ANION GAP SERPL CALC-SCNC: 12 MMOL/L (ref 10–20)
AST SERPL W P-5'-P-CCNC: 24 U/L (ref 9–39)
BASOPHILS # BLD AUTO: 0.05 X10*3/UL (ref 0–0.1)
BASOPHILS NFR BLD AUTO: 0.9 %
BILIRUB SERPL-MCNC: 0.6 MG/DL (ref 0–1.2)
BUN SERPL-MCNC: 14 MG/DL (ref 6–23)
CALCIUM SERPL-MCNC: 9.4 MG/DL (ref 8.6–10.3)
CHLORIDE SERPL-SCNC: 104 MMOL/L (ref 98–107)
CHOLEST SERPL-MCNC: 221 MG/DL (ref 0–199)
CHOLESTEROL/HDL RATIO: 3.8
CO2 SERPL-SCNC: 27 MMOL/L (ref 21–32)
CREAT SERPL-MCNC: 1.04 MG/DL (ref 0.5–1.3)
EGFRCR SERPLBLD CKD-EPI 2021: 86 ML/MIN/1.73M*2
EOSINOPHIL # BLD AUTO: 0.14 X10*3/UL (ref 0–0.7)
EOSINOPHIL NFR BLD AUTO: 2.4 %
ERYTHROCYTE [DISTWIDTH] IN BLOOD BY AUTOMATED COUNT: 13.2 % (ref 11.5–14.5)
GLUCOSE SERPL-MCNC: 91 MG/DL (ref 74–99)
HCT VFR BLD AUTO: 41.6 % (ref 41–52)
HDLC SERPL-MCNC: 58.7 MG/DL
HGB BLD-MCNC: 13 G/DL (ref 13.5–17.5)
IMM GRANULOCYTES # BLD AUTO: 0.01 X10*3/UL (ref 0–0.7)
IMM GRANULOCYTES NFR BLD AUTO: 0.2 % (ref 0–0.9)
LDLC SERPL CALC-MCNC: 138 MG/DL
LYMPHOCYTES # BLD AUTO: 1.32 X10*3/UL (ref 1.2–4.8)
LYMPHOCYTES NFR BLD AUTO: 22.6 %
MCH RBC QN AUTO: 29.7 PG (ref 26–34)
MCHC RBC AUTO-ENTMCNC: 31.3 G/DL (ref 32–36)
MCV RBC AUTO: 95 FL (ref 80–100)
MONOCYTES # BLD AUTO: 0.59 X10*3/UL (ref 0.1–1)
MONOCYTES NFR BLD AUTO: 10.1 %
NEUTROPHILS # BLD AUTO: 3.73 X10*3/UL (ref 1.2–7.7)
NEUTROPHILS NFR BLD AUTO: 63.8 %
NON HDL CHOLESTEROL: 162 MG/DL (ref 0–149)
NRBC BLD-RTO: 0 /100 WBCS (ref 0–0)
PLATELET # BLD AUTO: 359 X10*3/UL (ref 150–450)
POTASSIUM SERPL-SCNC: 4.5 MMOL/L (ref 3.5–5.3)
PROT SERPL-MCNC: 7.2 G/DL (ref 6.4–8.2)
PSA SERPL-MCNC: 0.34 NG/ML
RBC # BLD AUTO: 4.38 X10*6/UL (ref 4.5–5.9)
SODIUM SERPL-SCNC: 138 MMOL/L (ref 136–145)
TRIGL SERPL-MCNC: 123 MG/DL (ref 0–149)
VLDL: 25 MG/DL (ref 0–40)
WBC # BLD AUTO: 5.8 X10*3/UL (ref 4.4–11.3)

## 2024-06-06 PROCEDURE — 80053 COMPREHEN METABOLIC PANEL: CPT

## 2024-06-06 PROCEDURE — 80061 LIPID PANEL: CPT

## 2024-06-06 PROCEDURE — 99396 PREV VISIT EST AGE 40-64: CPT | Performed by: FAMILY MEDICINE

## 2024-06-06 PROCEDURE — 85025 COMPLETE CBC W/AUTO DIFF WBC: CPT

## 2024-06-06 PROCEDURE — 36415 COLL VENOUS BLD VENIPUNCTURE: CPT

## 2024-06-06 PROCEDURE — 1036F TOBACCO NON-USER: CPT | Performed by: FAMILY MEDICINE

## 2024-06-06 PROCEDURE — 84153 ASSAY OF PSA TOTAL: CPT

## 2024-06-06 PROCEDURE — 3008F BODY MASS INDEX DOCD: CPT | Performed by: FAMILY MEDICINE

## 2024-06-06 NOTE — PROGRESS NOTES
"Subjective   Patient ID: Mitch Felder is a 54 y.o. male who presents for Annual Exam.  HPI  Patient presents today for a physical.Yes need form filled out. Yes  fasting for blood work. Tries to eat a generally healthy diet. Exercises 7 x week .    Has no other new problem /question.    Review of systems  ; Patient seen today for exam denies any problems with headaches or vision, denies any shortness of breath chest pain nausea or vomiting, no black stool no blood in the stool no heartburn type symptoms denies any problems with constipation or diarrhea, and no dysuria-type symptoms    The patient's allergies medications were reviewed with them today    The patient's social family and surgical history or also reviewed here today, along with her past medical history.     Objective     Alert and active in  no acute distress  HEENT TMs clear oropharynx negative nares clear no drainage noted neck supple  With no adenopathy   Heart regular rate and rhythm without murmur and no carotid bruits  Lungs- clear to auscultation bilaterally, no wheeze or rhonchi noted  Thyroid -negative masses or nodularity  Abdomen- soft times four quadrants , bowel sounds positive no masses or organomegaly, negative tenderness guarding or rebound  Neurological exam unremarkable- DTRs in upper and lower extremities within normal limits.   skin -no lesions noted      /64 (BP Location: Left arm, Patient Position: Sitting, BP Cuff Size: Adult)   Pulse 61   Temp 36.3 °C (97.3 °F) (Temporal)   Resp 16   Ht 1.702 m (5' 7\")   Wt 61.8 kg (136 lb 3.2 oz)   SpO2 99%   BMI 21.33 kg/m²     No Known Allergies    Assessment/Plan   Problem List Items Addressed This Visit       Routine general medical examination at a health care facility - Primary    Relevant Orders    CBC and Auto Differential    Comprehensive metabolic panel    Lipid panel     Other Visit Diagnoses       Special screening for malignant neoplasm of prostate        Relevant Orders "    PSA    BMI 21.0-21.9, adult            Offered tetanus shot today he refused we will see if next year      Eating drinking well said no other bowel issues will see us next year sooner if any problems      If anything worsens or changes please call us at once, follow up in the office as planned,

## 2024-06-07 ENCOUNTER — TELEPHONE (OUTPATIENT)
Dept: PRIMARY CARE | Facility: CLINIC | Age: 54
End: 2024-06-07
Payer: COMMERCIAL

## 2024-06-07 NOTE — TELEPHONE ENCOUNTER
----- Message from Hernando Marques DO sent at 6/7/2024  8:01 AM EDT -----  The labs reviewed were all normal, except the cholesterol was elevated, we recommend less beef, less fried foods,, and more fruits and vegetables and fish in the diet, recheck in 6 months

## 2024-06-26 ENCOUNTER — TELEPHONE (OUTPATIENT)
Dept: PRIMARY CARE | Facility: CLINIC | Age: 54
End: 2024-06-26
Payer: COMMERCIAL

## 2024-06-26 NOTE — TELEPHONE ENCOUNTER
Pt calling, he states that he has blood in his stool. He noticed it yesterday and it has continued. He said it wasn't a lot of blood, but noticeable. He did reach out to GI and they referred him to his PCP. Did you want to see the patient for this?    Please advise

## 2024-06-27 ENCOUNTER — OFFICE VISIT (OUTPATIENT)
Dept: PRIMARY CARE | Facility: CLINIC | Age: 54
End: 2024-06-27
Payer: COMMERCIAL

## 2024-06-27 VITALS
OXYGEN SATURATION: 99 % | BODY MASS INDEX: 21.09 KG/M2 | RESPIRATION RATE: 16 BRPM | WEIGHT: 134.4 LBS | HEART RATE: 50 BPM | TEMPERATURE: 96.9 F | DIASTOLIC BLOOD PRESSURE: 62 MMHG | SYSTOLIC BLOOD PRESSURE: 110 MMHG | HEIGHT: 67 IN

## 2024-06-27 DIAGNOSIS — K56.699 COLON STRICTURE (MULTI): ICD-10-CM

## 2024-06-27 DIAGNOSIS — K52.9 COLITIS: ICD-10-CM

## 2024-06-27 DIAGNOSIS — K92.1 BLOOD IN STOOL: Primary | ICD-10-CM

## 2024-06-27 DIAGNOSIS — K64.0 GRADE I HEMORRHOIDS: ICD-10-CM

## 2024-06-27 PROCEDURE — 3008F BODY MASS INDEX DOCD: CPT | Performed by: FAMILY MEDICINE

## 2024-06-27 PROCEDURE — 99214 OFFICE O/P EST MOD 30 MIN: CPT | Performed by: FAMILY MEDICINE

## 2024-06-27 NOTE — PROGRESS NOTES
"Subjective   Patient ID: Mitch Felder is a 54 y.o. male who presents for Rectal Bleeding.  HPI    Patient presents today complaining of blood in his stool x 3 day ago. No Associated symptoms include. Denies pain or discomfort.  Pt has tried nothing OTC for his symptoms   Colonoscopy done on 7-10-23. Flexible sigmoidoscopy done on 11-27-23.  Denies black stool, No SOB, No chest pain.   Patient admits that his GI Dr. Told him to talk to his PCP.   Patient admits that he has been having diarrhea.     Has no other new problem /question.    Review of systems  ; Patient seen today for exam denies any problems with headaches or vision, denies any shortness of breath chest pain nausea or vomiting, no black stool no heartburn type symptoms denies any problems with constipation or diarrhea, and no dysuria-type symptoms    The patient's allergies medications were reviewed with them today    The patient's social family and surgical history or also reviewed here today, along with her past medical history.     Objective     Alert and active in  no acute distress  HEENT TMs clear oropharynx negative nares clear no drainage noted neck supple  With no adenopathy   Heart regular rate and rhythm without murmur and no carotid bruits  Lungs- clear to auscultation bilaterally, no wheeze or rhonchi noted  Thyroid -negative masses or nodularity  Abdomen- soft times four quadrants , bowel sounds positive no masses or organomegaly, negative tenderness guarding or rebound  Neurological exam unremarkable- DTRs in upper and lower extremities within normal limits.   skin -no lesions noted    Rectal exam shows a small hemorrhoid that recently looks like he had a blood clot and removed rectal is unremarkable no masses brown stool in the vault      /62 (BP Location: Right arm, Patient Position: Sitting, BP Cuff Size: Adult)   Pulse 50   Temp 36.1 °C (96.9 °F) (Temporal)   Resp 16   Ht 1.702 m (5' 7\")   Wt 61 kg (134 lb 6.4 oz)   SpO2 " 99%   BMI 21.05 kg/m²     No Known Allergies    Assessment/Plan   Problem List Items Addressed This Visit       Colon stricture (Multi)    Blood in stool     Other Visit Diagnoses       Grade I hemorrhoids    -  Primary    BMI 21.0-21.9, adult        Colitis              Recommended no prolong sitting on the toilet.   Recommended no lactose.  Recommended starting a probiotic and/or activia and, eating bananas.     With his history of colitis this would return or more bleeding he will need to back and see GI at this time I do not see anything else that I can do at this time as the bleeding oozing his abdomen exam is unremarkable    Scribe Attestation  By signing my name below, I, Brook Zamorano MA , Scribe   attest that this documentation has been prepared under the direction and in the presence of Hernando Marques DO.

## 2024-07-18 ENCOUNTER — OFFICE VISIT (OUTPATIENT)
Dept: PRIMARY CARE | Facility: CLINIC | Age: 54
End: 2024-07-18
Payer: COMMERCIAL

## 2024-07-18 VITALS
DIASTOLIC BLOOD PRESSURE: 58 MMHG | HEIGHT: 67 IN | OXYGEN SATURATION: 100 % | BODY MASS INDEX: 20.84 KG/M2 | TEMPERATURE: 97.6 F | WEIGHT: 132.8 LBS | HEART RATE: 71 BPM | SYSTOLIC BLOOD PRESSURE: 110 MMHG

## 2024-07-18 DIAGNOSIS — K92.1 BLOOD IN STOOL: Primary | ICD-10-CM

## 2024-07-18 DIAGNOSIS — K56.699 COLON STRICTURE (MULTI): ICD-10-CM

## 2024-07-18 DIAGNOSIS — K64.0 GRADE I HEMORRHOIDS: ICD-10-CM

## 2024-07-18 PROCEDURE — 3008F BODY MASS INDEX DOCD: CPT | Performed by: FAMILY MEDICINE

## 2024-07-18 PROCEDURE — 99213 OFFICE O/P EST LOW 20 MIN: CPT | Performed by: FAMILY MEDICINE

## 2024-07-18 PROCEDURE — 1036F TOBACCO NON-USER: CPT | Performed by: FAMILY MEDICINE

## 2024-07-18 RX ORDER — PREDNISONE 10 MG/1
TABLET ORAL
Qty: 51 TABLET | Refills: 0 | Status: SHIPPED | OUTPATIENT
Start: 2024-07-18

## 2024-07-18 NOTE — PROGRESS NOTES
Subjective   Patient ID: Mitch Felder is a 54 y.o. male who presents for Black or Bloody Stool.  HPI    Patient presents today for a follow-up on bloody stools. On 6-27-24 was seen by Dr. Marques. Was instructed to see GI. States that this problem is getting worse. Patient states he has pain and discomfort in the abdomen. Patient states he has not had a formed bowel movement it is all liquid. Patient states this blood is a bright red with clots. Blood is with every stool. Colonoscopy done on 7-10-23. Flexible sigmoidoscopy done on 11-27-23.         Has no other new problem /question.    Review of Systems  Constitutional:  no chills, no fever and no night sweats.  Eyes: no blurred vision and no eyesight problems.  ENT: no hearing loss, no nasal congestion, no hoarseness and no sore throat.  Neck: no mass (es) and no swelling.  Cardiovascular: no chest pain, no intermittent leg claudication, no lower extremity edema, no palpitation and no syncope.  Respiratory: no cough, no shortness of breath during exertion, no shortness of breath at rest and no wheezing.  Gastrointestinal: no abdominal pain, no blood in stools, no constipation, no diarrhea, no melena, no nausea, no rectal pain and no vomiting.  Genitourinary: no dysuria, no change in urinary frequency, no urinary hesitancy and no feelings of urinary urgency.  Musculoskeletal: no arthralgias, no back pain and no myalgias.  Integumentary: no new skin lesions and no rashes.  Neurological: no difficulty walking, no headache, no limb weakness, no numbness and no tingling.  Psychiatric/Behavioral: no anxiety, no depression, no anhedonia and no substance use disorders.  Endocrine: no recent weight gain and no recent weight loss.  Hematologic/Lymphatic: no tendency for easy bruising and no swollen glands    Objective   Physical Exam  Patient in for follow-up of his his PCP ended June rectal bleeding thought it was hemorrhoids still having this happen on and off he has a  "history of colon stricture with hemicolectomy and bleeding and inflammation at that point also he has mid epigastric and left upper quadrant discomfort no rebound or guarding markedly increased bowel sounds.  No vomiting just blood and clots in the stool.  Denies black dark tarry stools.  /58 (BP Location: Right arm, Patient Position: Sitting)   Pulse 71   Temp 36.4 °C (97.6 °F) (Temporal)   Ht 1.702 m (5' 7\")   Wt 60.2 kg (132 lb 12.8 oz)   SpO2 100%   BMI 20.80 kg/m²     Lab Results   Component Value Date    WBC 5.8 06/06/2024    HGB 13.0 (L) 06/06/2024    HCT 41.6 06/06/2024    MCV 95 06/06/2024     06/06/2024       Assessment/Plan plan is burst and taper prednisone if not significantly improving over the next 3 to 4 days he is to let us know may need to get back in and see Dr. Mireles for repeat colonoscopy.  If he worsens over the weekend he is to go to the emergency room.  Problem List Items Addressed This Visit          Endocrine/Metabolic    BMI 20.0-20.9, adult       Gastrointestinal and Abdominal    Colon stricture (Multi)    Relevant Medications    predniSONE (Deltasone) 10 mg tablet    Blood in stool - Primary    Relevant Medications    predniSONE (Deltasone) 10 mg tablet     Other Visit Diagnoses       Grade I hemorrhoids                "

## 2024-08-14 ENCOUNTER — TELEMEDICINE (OUTPATIENT)
Dept: PRIMARY CARE | Facility: CLINIC | Age: 54
End: 2024-08-14
Payer: COMMERCIAL

## 2024-08-14 DIAGNOSIS — J02.9 PHARYNGITIS, UNSPECIFIED ETIOLOGY: Primary | ICD-10-CM

## 2024-08-14 DIAGNOSIS — R05.9 COUGH IN ADULT PATIENT: ICD-10-CM

## 2024-08-14 DIAGNOSIS — J02.9 SORE THROAT: ICD-10-CM

## 2024-08-14 DIAGNOSIS — U07.1 COVID: ICD-10-CM

## 2024-08-14 PROCEDURE — 99442 PR PHYS/QHP TELEPHONE EVALUATION 11-20 MIN: CPT | Performed by: NURSE PRACTITIONER

## 2024-08-14 RX ORDER — BROMPHENIRAMINE MALEATE, PSEUDOEPHEDRINE HYDROCHLORIDE, AND DEXTROMETHORPHAN HYDROBROMIDE 2; 30; 10 MG/5ML; MG/5ML; MG/5ML
5 SYRUP ORAL 4 TIMES DAILY PRN
Qty: 120 ML | Refills: 1 | Status: SHIPPED | OUTPATIENT
Start: 2024-08-14 | End: 2024-08-24

## 2024-08-14 RX ORDER — AZITHROMYCIN 500 MG/1
500 TABLET, FILM COATED ORAL DAILY
Qty: 7 TABLET | Refills: 0 | Status: SHIPPED | OUTPATIENT
Start: 2024-08-14 | End: 2024-08-21

## 2024-08-14 NOTE — PATIENT INSTRUCTIONS
DISCHARGE SUMMARY:   Patient was seen and examined. Diagnosis, treatment, treatment options, and possible complications of today's illness discussed and explained to patient. Patient to take medication/s associated with this visit. Patient may also take OTC analgesic/antipyretic if needed for pain/fever. Advised to increase oral fluid intake. Advised steam inhalation if needed to relieve congestion. Advised warm saline gargle if needed to relieve throat discomfort. Advised Listerine antiseptic mouthwash gargle TID. Patient may use Cepacol oral spray as needed to relieve throat discomfort. Patient was advised to discard the old toothbrush and use a new toothbrush beginning on the third of antibiotics. Advised to follow instructions with regards to COVID testing. Advised on social distancing and communicability prevention. Advised to come back if with worsening or persistent symptoms. Patient verbalized understanding of plan of care.    Patient educated on the antiviral medication paxlovid. Patient given explanation on the possible side effects, indications, interactions, and contraindications of using this medication. Patient decided to decline medication at this time but agreed to call if decision changes.    Patient to come back in 7 - 10 days if needed for worsening symptoms.

## 2024-08-14 NOTE — PROGRESS NOTES
Subjective   Patient ID: Mitch Felder is a 54 y.o. male who is with a chief complaint of symptoms of respiratory tract infection and positive covid test at home.     HPI  Patient is a 54 y.o. male who CONSULTED AT [facility] - PHONE ONLY today. Patient is with complaints of sore throat, post nasal drip, cough, fatigue, muscle ache, chills and fever. He denies having any nasal congestion, nasal discharge, headache / sinus pain, loss of sense of taste, loss of sense of smell, nor diarrhea. Patient states that present condition started yesterday. Patient denies history of recent travel, exposure to person/people who tested positive for COVID 19, nor exposure to person/people with flu like symptoms. he denies shortness of breath, chest pain, palpitations, nor edema. he stated that he  tried OTC medications which afforded only slight relief of symptoms. he denies nausea, vomiting, abdominal pain, nor any other symptoms.    Patient states he had his COVID vaccine.  Patient states he have not yet received flu shot for this season.    Patient states he underwent testing for COVID earlier today and the result was POSITIVE.    Review of Systems  General: no weight loss, generally healthy, (+) fatigue  Head:  no headaches / sinus pain, no vertigo, no injury  Eyes: no diplopia, no tearing, no pain,   Ears: no change in hearing, no tinnitus, no bleeding, no vertigo  Mouth:  no dental difficulties, no gingival bleeding, (+) sore throat, no loss of sense of taste, (+) post nasal drip,   Nose: no congestion, no  discharge, no bleeding, no obstruction, no loss of sense of smell  Neck: no stiffness, no pain, no tenderness, no masses, no bruit  Pulmonary: no dyspnea, no wheezing, no hemoptysis, (+) cough  Cardiovascular: no chest pain, no palpitations, no syncope, no orthopnea  Gastrointestinal: no change in appetite, no dysphagia, no abdominal pains, no diarrhea, no emesis, no melena  Genito Urinary: no dysuria,  no urinary urgency, no nocturia, no incontinence, no change in nature of urine  Musculoskeletal: (+) muscle ache, no joint pain, no limitation of range of motion, no paresthesia, no numbness  Constitutional: (+) fever, (+) chills, no night sweats    Objective   NO VITAL SIGNS TAKEN FOR THIS PATIENT (VIRTUAL VISIT CONSULT - PHONE ONLY)    Physical Exam  PHYSICAL EXAMINATION WAS NOT DONE FOR THIS PATIENT (VIRTUAL VISIT CONSULT - PHONE ONLY)    Assessment/Plan   Problem List Items Addressed This Visit    None  Visit Diagnoses         Codes    Pharyngitis, unspecified etiology    -  Primary J02.9    Relevant Medications    azithromycin (Zithromax) 500 mg tablet    brompheniramine-pseudoeph-DM 2-30-10 mg/5 mL syrup    Cough in adult patient     R05.9    Relevant Medications    azithromycin (Zithromax) 500 mg tablet    brompheniramine-pseudoeph-DM 2-30-10 mg/5 mL syrup    Sore throat     J02.9    Relevant Medications    azithromycin (Zithromax) 500 mg tablet    brompheniramine-pseudoeph-DM 2-30-10 mg/5 mL syrup    COVID     U07.1    Relevant Medications    azithromycin (Zithromax) 500 mg tablet    brompheniramine-pseudoeph-DM 2-30-10 mg/5 mL syrup        DISCHARGE SUMMARY:   Patient was seen and examined. Diagnosis, treatment, treatment options, and possible complications of today's illness discussed and explained to patient. Patient to take medication/s associated with this visit. Patient may also take OTC analgesic/antipyretic if needed for pain/fever. Advised to increase oral fluid intake. Advised steam inhalation if needed to relieve congestion. Advised warm saline gargle if needed to relieve throat discomfort. Advised Listerine antiseptic mouthwash gargle TID. Patient may use Cepacol oral spray as needed to relieve throat discomfort. Patient was advised to discard the old toothbrush and use a new toothbrush beginning on the third of antibiotics. Advised to follow instructions with regards to COVID testing. Advised on  social distancing and communicability prevention. Advised to come back if with worsening or persistent symptoms. Patient verbalized understanding of plan of care.    Patient educated on the antiviral medication paxlovid. Patient given explanation on the possible side effects, indications, interactions, and contraindications of using this medication. Patient decided to decline medication at this time but agreed to call if decision changes.    Patient to come back in 7 - 10 days if needed for worsening symptoms.       CARO Noriega-CNP 08/14/24 10:30 AM

## 2024-08-14 NOTE — PROGRESS NOTES
Subjective   Patient ID: Mitch Felder is a 54 y.o. male who presents for No chief complaint on file..      Symptoms: cough, fever, chills, sore throat,   Length of symptoms: yesterday  OTC: motrin with mild help.  Related information:  pt tested positive today COVID  HPI     Review of Systems    Objective   There were no vitals taken for this visit.    Physical Exam    Assessment/Plan

## 2024-08-16 ENCOUNTER — TELEPHONE (OUTPATIENT)
Dept: PRIMARY CARE | Facility: CLINIC | Age: 54
End: 2024-08-16

## 2024-08-16 ENCOUNTER — DOCUMENTATION (OUTPATIENT)
Dept: PRIMARY CARE | Facility: CLINIC | Age: 54
End: 2024-08-16
Payer: COMMERCIAL

## 2024-08-16 DIAGNOSIS — R05.9 COUGH IN ADULT PATIENT: Primary | ICD-10-CM

## 2024-08-16 DIAGNOSIS — J02.9 SORE THROAT: ICD-10-CM

## 2024-08-16 DIAGNOSIS — R05.3 PERSISTENT COUGH: ICD-10-CM

## 2024-08-16 DIAGNOSIS — J02.9 PHARYNGITIS, UNSPECIFIED ETIOLOGY: ICD-10-CM

## 2024-08-16 RX ORDER — PREDNISONE 10 MG/1
TABLET ORAL
Qty: 30 TABLET | Refills: 0 | Status: SHIPPED | OUTPATIENT
Start: 2024-08-16 | End: 2024-08-26

## 2024-08-16 NOTE — TELEPHONE ENCOUNTER
Please call patient and inform him I prescribed prednisone. He should continue taking antibiotics. It has just been 2 days of intake. Tell him to continue the antibiotics. Instruct him to continue monitoring symptoms and call again in 2 days.    Thanks

## 2024-08-16 NOTE — PROGRESS NOTES
Patient called office and left message that symptoms are not improving. I will call office (Jacquie) to inform patient I will send prednisone and he needs to continue azithromycin. This is just the 3rd day of antibiotics. I will follow up patient.

## 2024-08-16 NOTE — TELEPHONE ENCOUNTER
[id number] - [place], [place] - [address]   Pt calling in regards to medication not working, was informed per Dandy to let him know if symptoms were still happening.

## 2025-01-08 ENCOUNTER — OFFICE VISIT (OUTPATIENT)
Dept: PRIMARY CARE | Facility: CLINIC | Age: 55
End: 2025-01-08
Payer: COMMERCIAL

## 2025-01-08 VITALS
HEIGHT: 67 IN | DIASTOLIC BLOOD PRESSURE: 56 MMHG | BODY MASS INDEX: 20.88 KG/M2 | SYSTOLIC BLOOD PRESSURE: 102 MMHG | WEIGHT: 133 LBS | TEMPERATURE: 97.5 F | OXYGEN SATURATION: 99 % | HEART RATE: 62 BPM | RESPIRATION RATE: 18 BRPM

## 2025-01-08 DIAGNOSIS — R10.30 LOWER ABDOMINAL PAIN: Primary | ICD-10-CM

## 2025-01-08 DIAGNOSIS — R19.12 HYPERACTIVE BOWEL SOUNDS: ICD-10-CM

## 2025-01-08 PROCEDURE — 99214 OFFICE O/P EST MOD 30 MIN: CPT | Performed by: FAMILY MEDICINE

## 2025-01-08 PROCEDURE — 1036F TOBACCO NON-USER: CPT | Performed by: FAMILY MEDICINE

## 2025-01-08 PROCEDURE — 3008F BODY MASS INDEX DOCD: CPT | Performed by: FAMILY MEDICINE

## 2025-01-08 RX ORDER — CIPROFLOXACIN 500 MG/1
500 TABLET ORAL 2 TIMES DAILY
Qty: 20 TABLET | Refills: 0 | Status: SHIPPED | OUTPATIENT
Start: 2025-01-08 | End: 2025-01-18

## 2025-01-08 ASSESSMENT — PATIENT HEALTH QUESTIONNAIRE - PHQ9
SUM OF ALL RESPONSES TO PHQ9 QUESTIONS 1 AND 2: 0
1. LITTLE INTEREST OR PLEASURE IN DOING THINGS: NOT AT ALL
2. FEELING DOWN, DEPRESSED OR HOPELESS: NOT AT ALL

## 2025-01-08 NOTE — PROGRESS NOTES
"Subjective   Patient ID: Mitch Felder is a 54 y.o. male who presents for Abdominal Pain./History of colitis    HPI    Patient presents today for abdominal pain. Localized in the mid-lower abdomen. Ongoing x 2 weeks. Patient states it is just worsening. He states he had a colon surgery in 2023 for a blockage/colon stricture, and about 6 weeks later, he started having pain. Patient states the pain has been worsening over the past 2 weeks. He is having more BM than usual, 3 BM/day. States the BM are not formed. He states he sometimes has blood in the stool and it will come out like \"pellets.\" Rates pain today at 6/10. No blood in stool today. He was seen by Dr. Jovel in 7/2024 for blood in stool. He was tested Neg for Crohn's disease. No Crohn's disease in parents.       Review of systems  ; Patient seen today for exam denies any problems with headaches or vision, denies any shortness of breath chest pain nausea or vomiting, no black stool no blood in the stool no heartburn type symptoms denies any problems with constipation or diarrhea, and no dysuria-type symptoms    The patient's allergies medications were reviewed with them today    The patient's social family and surgical history or also reviewed here today, along with her past medical history.     Objective     Alert and active in  no acute distress  HEENT TMs clear oropharynx negative nares clear no drainage noted neck supple  With no adenopathy   Heart regular rate and rhythm without murmur and no carotid bruits  Lungs- clear to auscultation bilaterally, no wheeze or rhonchi noted  Thyroid -negative masses or nodularity  Abdomen- soft times four quadrants , bowel sounds positive no masses or organomegaly, positive tenderness all 4 quadrants no guarding or rebound noted but slightly tender midepigastric such as consistent with possible bowel obstruction  Neurological exam unremarkable- DTRs in upper and lower extremities within normal limits.   skin -no " "lesions noted      /56 (BP Location: Left arm, Patient Position: Sitting, BP Cuff Size: Adult)   Pulse 62   Temp 36.4 °C (97.5 °F) (Temporal)   Resp 18   Ht 1.702 m (5' 7\")   Wt 60.3 kg (133 lb)   SpO2 99%   BMI 20.83 kg/m²     Allergies   Allergen Reactions    Amoxicillin-Pot Clavulanate Diarrhea    Milk Diarrhea       Assessment/Plan   Problem List Items Addressed This Visit       Abdominal pain - Primary    Relevant Medications    ciprofloxacin (Cipro) 500 mg tablet    BMI 20.0-20.9, adult     Other Visit Diagnoses       Hyperactive bowel sounds                Cipro prescribed today.  S as he has had some bouts of colitis in the past on those Crohn's disease was all negative    If the pain gets worse over the next 2-3 days, we recommended him to visit ER and obtain CT scan of the abdomen pelvis with IV contrast to rule out bowel obstruction for further treatment plan.    Will call me on Thursday with an update we will order CT scan of abdomen and pelvis with with contrast or he understands once again to the ER if things worsen or change in any way    If anything worsens or changes please call us at once, follow up in the office as planned,       Scribe Attestation  By signing my name below, I, Angelic Carr, Scriblena   attest that this documentation has been prepared under the direction and in the presence of Hernando Marques DO.  "

## 2025-01-10 ENCOUNTER — TELEPHONE (OUTPATIENT)
Dept: PRIMARY CARE | Facility: CLINIC | Age: 55
End: 2025-01-10
Payer: COMMERCIAL

## 2025-01-10 DIAGNOSIS — R10.9 ABDOMINAL PAIN: ICD-10-CM

## 2025-01-10 DIAGNOSIS — R10.30 LOWER ABDOMINAL PAIN: Primary | ICD-10-CM

## 2025-01-10 NOTE — TELEPHONE ENCOUNTER
Hernando Marques, DO Betzaida Mendez MA; Do [id number] [facility] Clerical; [id number] [place] [id number] Clinical Support Staff4 minutes ago (1:54 PM)       If his abdominal pain is worsening, nauseous or vomiting he needs to go to the emergency room,,, otherwise he needs a CT of his abdomen and pelvis with contrast

## 2025-01-10 NOTE — TELEPHONE ENCOUNTER
PT HAD AN APPT WEDNESDAY AND WAS TOLD TO FOLLOW UP TODAY, SYMPTOMS ARE ALL THE SAME THERE IS NO CHANGE.    PLEASE ADVISE

## 2025-01-20 ENCOUNTER — HOSPITAL ENCOUNTER (OUTPATIENT)
Dept: RADIOLOGY | Facility: CLINIC | Age: 55
Discharge: HOME | End: 2025-01-20
Payer: COMMERCIAL

## 2025-01-20 DIAGNOSIS — R10.9 ABDOMINAL PAIN: ICD-10-CM

## 2025-01-20 DIAGNOSIS — R10.30 LOWER ABDOMINAL PAIN: ICD-10-CM

## 2025-01-20 PROCEDURE — 2550000001 HC RX 255 CONTRASTS: Performed by: FAMILY MEDICINE

## 2025-01-20 PROCEDURE — 74177 CT ABD & PELVIS W/CONTRAST: CPT

## 2025-01-20 RX ADMIN — IOHEXOL 75 ML: 350 INJECTION, SOLUTION INTRAVENOUS at 10:48

## 2025-01-22 ENCOUNTER — TELEPHONE (OUTPATIENT)
Dept: PRIMARY CARE | Facility: CLINIC | Age: 55
End: 2025-01-22
Payer: COMMERCIAL

## 2025-01-22 NOTE — TELEPHONE ENCOUNTER
Hernando Marques, DO [id number] [facility] Clerical1 hour ago (5:19 PM)       I spoke with Dr. Chapman and his team, will be reaching out to [facility] to schedule appointment and EGD over next 2 weeks, give him a call and see if that office has reached out to him   Patient aware they will be calling

## 2025-01-22 NOTE — TELEPHONE ENCOUNTER
Radiology called about pt ct of abdomen and wanted to make you aware of the critical results.     They will not close this alert until his pcp has reviewed it and asked I call when it happens

## 2025-01-29 PROBLEM — R05.9 COUGH: Status: ACTIVE | Noted: 2025-01-29

## 2025-01-29 PROBLEM — R19.5 DARK STOOLS: Status: ACTIVE | Noted: 2025-01-29

## 2025-01-29 PROBLEM — A09 INTESTINAL INFECTION: Status: ACTIVE | Noted: 2025-01-29

## 2025-01-29 PROBLEM — J02.9 SORE THROAT: Status: ACTIVE | Noted: 2025-01-29

## 2025-01-29 PROBLEM — R19.7 DIARRHEA IN ADULT PATIENT: Status: ACTIVE | Noted: 2025-01-29

## 2025-01-29 PROBLEM — R93.3 ABNORMAL CT SCAN, COLON: Status: ACTIVE | Noted: 2025-01-29

## 2025-01-29 PROBLEM — J30.9 ALLERGIC RHINITIS: Status: ACTIVE | Noted: 2025-01-29

## 2025-01-29 PROBLEM — K92.1 BLOODY STOOL: Status: ACTIVE | Noted: 2025-01-29

## 2025-01-29 PROBLEM — K52.9 COLITIS: Status: ACTIVE | Noted: 2025-01-29

## 2025-01-29 PROBLEM — J45.909 ASTHMA: Status: ACTIVE | Noted: 2025-01-29

## 2025-01-29 PROBLEM — K62.5 BRBPR (BRIGHT RED BLOOD PER RECTUM): Status: ACTIVE | Noted: 2025-01-29

## 2025-01-29 PROBLEM — D64.9 ANEMIA: Status: ACTIVE | Noted: 2025-01-29

## 2025-01-29 PROBLEM — S99.919A INJURY, ANKLE: Status: ACTIVE | Noted: 2025-01-29

## 2025-01-29 PROBLEM — K21.9 GERD (GASTROESOPHAGEAL REFLUX DISEASE): Status: ACTIVE | Noted: 2025-01-29

## 2025-01-29 RX ORDER — DOXYCYCLINE 100 MG/1
1 CAPSULE ORAL DAILY
COMMUNITY

## 2025-01-30 ENCOUNTER — APPOINTMENT (OUTPATIENT)
Dept: GASTROENTEROLOGY | Facility: CLINIC | Age: 55
End: 2025-01-30
Payer: COMMERCIAL

## 2025-01-30 VITALS
OXYGEN SATURATION: 98 % | WEIGHT: 128.6 LBS | TEMPERATURE: 97.3 F | SYSTOLIC BLOOD PRESSURE: 109 MMHG | BODY MASS INDEX: 20.18 KG/M2 | DIASTOLIC BLOOD PRESSURE: 75 MMHG | HEART RATE: 81 BPM | HEIGHT: 67 IN

## 2025-01-30 DIAGNOSIS — K56.699 COLON STRICTURE (MULTI): Primary | ICD-10-CM

## 2025-01-30 DIAGNOSIS — R93.3 ABNORMAL CT SCAN, COLON: ICD-10-CM

## 2025-01-30 PROCEDURE — 99214 OFFICE O/P EST MOD 30 MIN: CPT | Performed by: INTERNAL MEDICINE

## 2025-01-30 PROCEDURE — 1036F TOBACCO NON-USER: CPT | Performed by: INTERNAL MEDICINE

## 2025-01-30 PROCEDURE — 3008F BODY MASS INDEX DOCD: CPT | Performed by: INTERNAL MEDICINE

## 2025-01-30 NOTE — PROGRESS NOTES
"Gastroenterology Office Visit     History of Present Illness:   Since last OV in 1/24, patient has had CT A/P 10 days ago for bloating, abdominal pain and borborygmi which occurs spontaneously.  Sx's started 1-2 weeks prior to the CT.     Denies diarrhea and bleeding.  No n/v. Has a BM every other day, usually just \"marbles or dane.\"      OV in 1/24:  Since last OV in 11/23, patient has had flex sig and calprotectin; see below for complete results.    Denies abdominal pain, diarrhea, and bleeding.       OV in 11/23:  Mitch Felder is a 53 y.o. male who presents to GI clinic for follow up of transverse colon stricture.  Since last OV in 7/23 with Rashida Lynch, patient has had surgery with Dr. Raymond in 8/23: Laparoscopic extended right  hemicolectomy, stapled side-to-side ileocolic anastomosis, and mobilization of splenic flexure.  Path showed CAC with fibrosis and stricture, but no cancer; pathologist favored IBD over ischemia.      He was having L-sided abdominal pain, diarrhea and bleeding when he was first seen by Ms. Lynch.  Pain, diarrhea, and bleeding have resolved after surgery.       OV in 7/23:  Patient following up to review test results.  This case was reviewed with Dr. Chapman, collaborating physician.  Patient initially was sent with Dr. Cardenas for a colonoscopy due to abnormal CT scan findings at [facility].  ->Colonoscopy 3/21/2023: Stricture in the descending colon. Biopsied. Patchy moderate inflammation found in the rectum and in the sigmoid colon. Biopsied. The distal rectum and anal verge are normal on retroflexion view. Repeat colonoscopy in 4 months to evaluate response to therapy.  Pathology returned positive for active chronic inflammation.  -> He was placed on a prednisone taper shortly after his colonoscopy/pathology results.  -> Repeat colonoscopy With Dr. Chapman 7/10/2023: 10 mm polyp in the sigmoid colon, removed with cold snare. Stricture at 80 cm proximal to the anus. Tattooed. " Pseudopolyps in the proximal sigmoid colon and in the descending colon. Nonbleeding IH.  Pathology unremarkable  ->Barium enema 7/21/2023: Severe stricture of the transverse colon around the splenic flexure. No evidence of bowel leak.     Patient denies constipation or diarrhea. No abdominal pain.  He has changed his diet. Eating more fiber and avoiding processed foods. Drinking more water throughout the day  Weight and appetite are stable per the patient.  He denies melena, hematochezia or hematemesis.  No family history of CRC or IBD    Review of Systems  Constitutional: denies fever/ chills, night sweats, wt loss and fatigue  Respiratory: denies SOB, GAGNON  CV: denies chest pain and LE edema  Neuro: denies weakness and difficulty walking      Past Medical History   has a past medical history of Other conditions influencing health status and Rash and other nonspecific skin eruption.     Problem List  Patient Active Problem List   Diagnosis    Abdominal pain    Acute sinusitis    Impetigo    Routine general medical examination at a health care facility    BMI 20.0-20.9, adult    Colon stricture (CMS/HCC)       Past Surgical History  Past Surgical History:   Procedure Laterality Date    OTHER SURGICAL HISTORY  07/05/2019    No history of surgery       Social History   reports that he has never smoked. He has never used smokeless tobacco. He reports that he does not currently use alcohol. He reports that he does not use drugs.     Family History  family history includes Heart disease in his father; No Known Problems in his mother.       Allergies  No Known Allergies    Medications  Current Outpatient Medications on File Prior to Visit   Medication Sig Dispense Refill    doxycycline (Monodox) 100 mg capsule Take 1 capsule (100 mg) by mouth once daily.       No current facility-administered medications on file prior to visit.         Objective   /75   Pulse 81   Temp 36.3 °C (97.3 °F) (Temporal)   Ht 1.702 m (5'  "7\")   Wt 58.3 kg (128 lb 9.6 oz)   SpO2 98%   BMI 20.14 kg/m²            LABS     Component      Latest Ref Rng 11/16/2023   WBC      4.4 - 11.3 x10*3/uL 6.1    nRBC      0.0 - 0.0 /100 WBCs 0.0    RBC      4.50 - 5.90 x10*6/uL 4.13 (L)    HEMOGLOBIN      13.5 - 17.5 g/dL 12.2 (L)    HEMATOCRIT      41.0 - 52.0 % 38.6 (L)    MCV      80 - 100 fL 94    MCH      26.0 - 34.0 pg 29.5    MCHC      32.0 - 36.0 g/dL 31.6 (L)    RED CELL DISTRIBUTION WIDTH      11.5 - 14.5 % 13.1    Platelets      150 - 450 x10*3/uL 333           Calprotectin 11/23: 117  Calprotectin 1/24: 335    Radiology  CT A/P 1/25: Stricture is apparent in the colon just distal to the ileocolic  anastomosis over a length of about 2 cm.  No associated mass.      Endoscopy  Flex sig 11/23: stool prevented me from advancing to the anastomosis; rectosigmoid colon normal; no colitis on path      Assessment/Plan   Mitch Felder is a 54 y.o. male who presents to GI clinic for colonic stricture, s/p lap extended R hemicolectomy in 8/23, and possible IBD vs ischemia as the etiology.  No colitis on subsequent flex sig. Now with recurrent stricture distal to the anastomosis.  Possibilities include ischemic anastomotic stricture vs Crohn's.       Colon stricture (Multi)  1) Please schedule colonoscopy with possible dilation at [facility] with Gatorade/miralax prep  2) You may not eat any solid foods the ENTIRE day before the procedure.  You may have clear liquids, including water, Sprite/ginger ale, apple juice, chicken broth, Jello, tea/coffee or Gatorade.  Please do not have any red or purple liquids/ Jello. No milk or cream in your tea/coffee.   3) Take the prep as directed.   4) You will need a  for the procedure.   5) You will need to contact your cardiologist or primary provider if you are taking a blood thinner.  This may need to be stopped for the procedure.            Joesph Chapman MD     "

## 2025-01-30 NOTE — H&P (VIEW-ONLY)
"Gastroenterology Office Visit     History of Present Illness:   Since last OV in 1/24, patient has had CT A/P 10 days ago for bloating, abdominal pain and borborygmi which occurs spontaneously.  Sx's started 1-2 weeks prior to the CT.     Denies diarrhea and bleeding.  No n/v. Has a BM every other day, usually just \"marbles or dane.\"      OV in 1/24:  Since last OV in 11/23, patient has had flex sig and calprotectin; see below for complete results.    Denies abdominal pain, diarrhea, and bleeding.       OV in 11/23:  Mitch Felder is a 53 y.o. male who presents to GI clinic for follow up of transverse colon stricture.  Since last OV in 7/23 with Rashida Lynch, patient has had surgery with Dr. Raymond in 8/23: Laparoscopic extended right  hemicolectomy, stapled side-to-side ileocolic anastomosis, and mobilization of splenic flexure.  Path showed CAC with fibrosis and stricture, but no cancer; pathologist favored IBD over ischemia.      He was having L-sided abdominal pain, diarrhea and bleeding when he was first seen by Ms. Lynch.  Pain, diarrhea, and bleeding have resolved after surgery.       OV in 7/23:  Patient following up to review test results.  This case was reviewed with Dr. Chapman, collaborating physician.  Patient initially was sent with Dr. Cardenas for a colonoscopy due to abnormal CT scan findings at [facility].  ->Colonoscopy 3/21/2023: Stricture in the descending colon. Biopsied. Patchy moderate inflammation found in the rectum and in the sigmoid colon. Biopsied. The distal rectum and anal verge are normal on retroflexion view. Repeat colonoscopy in 4 months to evaluate response to therapy.  Pathology returned positive for active chronic inflammation.  -> He was placed on a prednisone taper shortly after his colonoscopy/pathology results.  -> Repeat colonoscopy With Dr. Chapman 7/10/2023: 10 mm polyp in the sigmoid colon, removed with cold snare. Stricture at 80 cm proximal to the anus. Tattooed. " Pseudopolyps in the proximal sigmoid colon and in the descending colon. Nonbleeding IH.  Pathology unremarkable  ->Barium enema 7/21/2023: Severe stricture of the transverse colon around the splenic flexure. No evidence of bowel leak.     Patient denies constipation or diarrhea. No abdominal pain.  He has changed his diet. Eating more fiber and avoiding processed foods. Drinking more water throughout the day  Weight and appetite are stable per the patient.  He denies melena, hematochezia or hematemesis.  No family history of CRC or IBD    Review of Systems  Constitutional: denies fever/ chills, night sweats, wt loss and fatigue  Respiratory: denies SOB, GAGNON  CV: denies chest pain and LE edema  Neuro: denies weakness and difficulty walking      Past Medical History   has a past medical history of Other conditions influencing health status and Rash and other nonspecific skin eruption.     Problem List  Patient Active Problem List   Diagnosis    Abdominal pain    Acute sinusitis    Impetigo    Routine general medical examination at a health care facility    BMI 20.0-20.9, adult    Colon stricture (CMS/HCC)       Past Surgical History  Past Surgical History:   Procedure Laterality Date    OTHER SURGICAL HISTORY  07/05/2019    No history of surgery       Social History   reports that he has never smoked. He has never used smokeless tobacco. He reports that he does not currently use alcohol. He reports that he does not use drugs.     Family History  family history includes Heart disease in his father; No Known Problems in his mother.       Allergies  No Known Allergies    Medications  Current Outpatient Medications on File Prior to Visit   Medication Sig Dispense Refill    doxycycline (Monodox) 100 mg capsule Take 1 capsule (100 mg) by mouth once daily.       No current facility-administered medications on file prior to visit.         Objective   /75   Pulse 81   Temp 36.3 °C (97.3 °F) (Temporal)   Ht 1.702 m (5'  "7\")   Wt 58.3 kg (128 lb 9.6 oz)   SpO2 98%   BMI 20.14 kg/m²            LABS     Component      Latest Ref Rng 11/16/2023   WBC      4.4 - 11.3 x10*3/uL 6.1    nRBC      0.0 - 0.0 /100 WBCs 0.0    RBC      4.50 - 5.90 x10*6/uL 4.13 (L)    HEMOGLOBIN      13.5 - 17.5 g/dL 12.2 (L)    HEMATOCRIT      41.0 - 52.0 % 38.6 (L)    MCV      80 - 100 fL 94    MCH      26.0 - 34.0 pg 29.5    MCHC      32.0 - 36.0 g/dL 31.6 (L)    RED CELL DISTRIBUTION WIDTH      11.5 - 14.5 % 13.1    Platelets      150 - 450 x10*3/uL 333           Calprotectin 11/23: 117  Calprotectin 1/24: 335    Radiology  CT A/P 1/25: Stricture is apparent in the colon just distal to the ileocolic  anastomosis over a length of about 2 cm.  No associated mass.      Endoscopy  Flex sig 11/23: stool prevented me from advancing to the anastomosis; rectosigmoid colon normal; no colitis on path      Assessment/Plan   Mitch Felder is a 54 y.o. male who presents to GI clinic for colonic stricture, s/p lap extended R hemicolectomy in 8/23, and possible IBD vs ischemia as the etiology.  No colitis on subsequent flex sig. Now with recurrent stricture distal to the anastomosis.  Possibilities include ischemic anastomotic stricture vs Crohn's.       Colon stricture (Multi)  1) Please schedule colonoscopy with possible dilation at [facility] with Gatorade/miralax prep  2) You may not eat any solid foods the ENTIRE day before the procedure.  You may have clear liquids, including water, Sprite/ginger ale, apple juice, chicken broth, Jello, tea/coffee or Gatorade.  Please do not have any red or purple liquids/ Jello. No milk or cream in your tea/coffee.   3) Take the prep as directed.   4) You will need a  for the procedure.   5) You will need to contact your cardiologist or primary provider if you are taking a blood thinner.  This may need to be stopped for the procedure.            Joesph Chapman MD     "

## 2025-01-30 NOTE — PATIENT INSTRUCTIONS
1) Please schedule colonoscopy at [facility] with Gatorade/miralax prep  2) You may not eat any solid foods the ENTIRE day before the procedure.  You may have clear liquids, including water, Sprite/ginger ale, apple juice, chicken broth, Jello, tea/coffee or Gatorade.  Please do not have any red or purple liquids/ Jello. No milk or cream in your tea/coffee.   3) Take the prep as directed.   4) You will need a  for the procedure.   5) You will need to contact your cardiologist or primary provider if you are taking a blood thinner.  This may need to be stopped for the procedure.

## 2025-01-30 NOTE — ASSESSMENT & PLAN NOTE
1) Please schedule colonoscopy with possible dilation at [facility] with Gatorade/miralax prep  2) You may not eat any solid foods the ENTIRE day before the procedure.  You may have clear liquids, including water, Sprite/ginger ale, apple juice, chicken broth, Jello, tea/coffee or Gatorade.  Please do not have any red or purple liquids/ Jello. No milk or cream in your tea/coffee.   3) Take the prep as directed.   4) You will need a  for the procedure.   5) You will need to contact your cardiologist or primary provider if you are taking a blood thinner.  This may need to be stopped for the procedure.

## 2025-02-04 ENCOUNTER — TELEPHONE (OUTPATIENT)
Dept: PRIMARY CARE | Facility: CLINIC | Age: 55
End: 2025-02-04
Payer: COMMERCIAL

## 2025-02-04 NOTE — TELEPHONE ENCOUNTER
Patient calling wants to know if he needs to continue on a all liquid diet still, has been on it for 2 weeks,, wants to know what dietary restrictions if he can go back to solid foods, if any please advise? Aware you are out of office today. He is ok with call back on Wed. [phone]

## 2025-02-05 NOTE — TELEPHONE ENCOUNTER
Okay to advance his diet,,, I would stay away from any foods that constipate him and make sure he is taking fiber and a probiotic every day until he sees GI, I do not know what other recommendations the GI doctor made for him but I would follow all of those until he gets his repeat colonoscopy       Spoke with patient, he is aware.

## 2025-02-17 ENCOUNTER — CLINICAL SUPPORT (OUTPATIENT)
Dept: PREADMISSION TESTING | Facility: HOSPITAL | Age: 55
End: 2025-02-17
Payer: COMMERCIAL

## 2025-02-17 VITALS — HEIGHT: 67 IN | WEIGHT: 130.07 LBS | BODY MASS INDEX: 20.42 KG/M2

## 2025-02-17 NOTE — PREPROCEDURE INSTRUCTIONS

## 2025-02-26 ENCOUNTER — ANESTHESIA (OUTPATIENT)
Dept: GASTROENTEROLOGY | Facility: HOSPITAL | Age: 55
End: 2025-02-26
Payer: COMMERCIAL

## 2025-02-26 ENCOUNTER — ANESTHESIA EVENT (OUTPATIENT)
Dept: GASTROENTEROLOGY | Facility: HOSPITAL | Age: 55
End: 2025-02-26
Payer: COMMERCIAL

## 2025-02-26 ENCOUNTER — HOSPITAL ENCOUNTER (OUTPATIENT)
Dept: GASTROENTEROLOGY | Facility: HOSPITAL | Age: 55
Discharge: HOME | End: 2025-02-26
Payer: COMMERCIAL

## 2025-02-26 VITALS
HEART RATE: 56 BPM | TEMPERATURE: 96.4 F | BODY MASS INDEX: 18.96 KG/M2 | WEIGHT: 121.03 LBS | RESPIRATION RATE: 19 BRPM | DIASTOLIC BLOOD PRESSURE: 86 MMHG | SYSTOLIC BLOOD PRESSURE: 129 MMHG | OXYGEN SATURATION: 100 %

## 2025-02-26 DIAGNOSIS — R93.3 ABNORMAL CT SCAN, COLON: ICD-10-CM

## 2025-02-26 DIAGNOSIS — K56.699 COLON STRICTURE (MULTI): ICD-10-CM

## 2025-02-26 PROCEDURE — 45380 COLONOSCOPY AND BIOPSY: CPT | Performed by: INTERNAL MEDICINE

## 2025-02-26 PROCEDURE — 2500000004 HC RX 250 GENERAL PHARMACY W/ HCPCS (ALT 636 FOR OP/ED): Performed by: NURSE ANESTHETIST, CERTIFIED REGISTERED

## 2025-02-26 PROCEDURE — 7100000010 HC PHASE TWO TIME - EACH INCREMENTAL 1 MINUTE

## 2025-02-26 PROCEDURE — 3700000002 HC GENERAL ANESTHESIA TIME - EACH INCREMENTAL 1 MINUTE

## 2025-02-26 PROCEDURE — 7100000009 HC PHASE TWO TIME - INITIAL BASE CHARGE

## 2025-02-26 PROCEDURE — 3700000001 HC GENERAL ANESTHESIA TIME - INITIAL BASE CHARGE

## 2025-02-26 RX ORDER — PROPOFOL 10 MG/ML
INJECTION, EMULSION INTRAVENOUS AS NEEDED
Status: DISCONTINUED | OUTPATIENT
Start: 2025-02-26 | End: 2025-02-26

## 2025-02-26 RX ORDER — SODIUM CHLORIDE, SODIUM LACTATE, POTASSIUM CHLORIDE, CALCIUM CHLORIDE 600; 310; 30; 20 MG/100ML; MG/100ML; MG/100ML; MG/100ML
INJECTION, SOLUTION INTRAVENOUS CONTINUOUS PRN
Status: DISCONTINUED | OUTPATIENT
Start: 2025-02-26 | End: 2025-02-26

## 2025-02-26 RX ADMIN — PROPOFOL 200 MG: 10 INJECTION, EMULSION INTRAVENOUS at 10:18

## 2025-02-26 RX ADMIN — SODIUM CHLORIDE, POTASSIUM CHLORIDE, SODIUM LACTATE AND CALCIUM CHLORIDE: 600; 310; 30; 20 INJECTION, SOLUTION INTRAVENOUS at 10:15

## 2025-02-26 ASSESSMENT — PAIN SCALES - GENERAL
PAIN_LEVEL: 0
PAINLEVEL_OUTOF10: 0 - NO PAIN

## 2025-02-26 ASSESSMENT — PAIN - FUNCTIONAL ASSESSMENT
PAIN_FUNCTIONAL_ASSESSMENT: 0-10

## 2025-02-26 ASSESSMENT — COLUMBIA-SUICIDE SEVERITY RATING SCALE - C-SSRS
1. IN THE PAST MONTH, HAVE YOU WISHED YOU WERE DEAD OR WISHED YOU COULD GO TO SLEEP AND NOT WAKE UP?: NO
6. HAVE YOU EVER DONE ANYTHING, STARTED TO DO ANYTHING, OR PREPARED TO DO ANYTHING TO END YOUR LIFE?: NO
2. HAVE YOU ACTUALLY HAD ANY THOUGHTS OF KILLING YOURSELF?: NO

## 2025-02-26 NOTE — ANESTHESIA PREPROCEDURE EVALUATION
Patient: Mitch Felder    Procedure Information       Date/Time: 02/26/25 1030    Scheduled providers: Joesph Chapman MD; Jose Miranda DO; Shelby Zamudio; Cecilia Forrest RN    Procedure: COLONOSCOPY    Location: [facility]            Relevant Problems   Pulmonary   (+) Asthma      GI   (+) BRBPR (bright red blood per rectum)   (+) GERD (gastroesophageal reflux disease)      Hematology   (+) Anemia      HEENT   (+) Acute sinusitis      ID   (+) Impetigo   (+) Intestinal infection       Clinical information reviewed:                   NPO Detail:  No data recorded     Physical Exam    Airway  Mallampati: II     Cardiovascular   Rhythm: regular  Rate: normal     Dental    Pulmonary - normal exam     Abdominal - normal exam             Anesthesia Plan    History of general anesthesia?: yes  History of complications of general anesthesia?: no    ASA 2     MAC     intravenous induction   Postoperative administration of opioids is intended.  Anesthetic plan and risks discussed with patient.

## 2025-02-26 NOTE — ANESTHESIA POSTPROCEDURE EVALUATION
Patient: Mitch Felder    Procedure Summary       Date: 02/26/25 Room / Location: [facility]    Anesthesia Start: 1015 Anesthesia Stop:     Procedure: COLONOSCOPY Diagnosis:       Colon stricture (Multi)      Abnormal CT scan, colon    Scheduled Providers: Joesph Chapman MD; Jose Miranda DO; Shelby Zamudio; Cecilia Forrest RN Responsible Provider: Jose Miranda DO    Anesthesia Type: MAC ASA Status: 2            Anesthesia Type: MAC    Vitals Value Taken Time   BP 95/59 02/26/25 1033   Temp 36.5 02/26/25 1033   Pulse 63 02/26/25 1033   Resp 18 02/26/25 1033   SpO2 100 02/26/25 1033       Anesthesia Post Evaluation    Patient location during evaluation: bedside  Patient participation: complete - patient participated  Level of consciousness: awake and alert  Pain score: 0  Pain management: adequate  Airway patency: patent  Cardiovascular status: acceptable and stable  Respiratory status: acceptable and room air  Hydration status: acceptable  Postoperative Nausea and Vomiting: none      No notable events documented.

## 2025-02-26 NOTE — Clinical Note
Patient tolerated procedure well. Appears comfortable with no complaints of pain. VS stable. Arousable prior to transport. Patient transported to [facility] via cart.  Report called  per  crna            . Handoff completed

## 2025-02-26 NOTE — Clinical Note
Huddle and Timeout completed together with team. Patient wristband and REMY information verified.  Anesthesia safety check completed

## 2025-02-26 NOTE — DISCHARGE INSTRUCTIONS

## 2025-03-11 ENCOUNTER — APPOINTMENT (OUTPATIENT)
Dept: GASTROENTEROLOGY | Facility: CLINIC | Age: 55
End: 2025-03-11
Payer: COMMERCIAL

## 2025-03-11 VITALS
DIASTOLIC BLOOD PRESSURE: 85 MMHG | HEART RATE: 69 BPM | WEIGHT: 130 LBS | HEIGHT: 67 IN | OXYGEN SATURATION: 99 % | BODY MASS INDEX: 20.4 KG/M2 | SYSTOLIC BLOOD PRESSURE: 144 MMHG

## 2025-03-11 DIAGNOSIS — K52.9 COLITIS: Primary | ICD-10-CM

## 2025-03-11 PROCEDURE — 99214 OFFICE O/P EST MOD 30 MIN: CPT | Performed by: INTERNAL MEDICINE

## 2025-03-11 PROCEDURE — 3008F BODY MASS INDEX DOCD: CPT | Performed by: INTERNAL MEDICINE

## 2025-03-11 PROCEDURE — 1036F TOBACCO NON-USER: CPT | Performed by: INTERNAL MEDICINE

## 2025-03-11 RX ORDER — MESALAMINE 1.2 G/1
2400 TABLET, DELAYED RELEASE ORAL DAILY
Qty: 60 TABLET | Refills: 11 | Status: SHIPPED | OUTPATIENT
Start: 2025-03-11 | End: 2026-03-11

## 2025-03-11 NOTE — PROGRESS NOTES
Gastroenterology Office Visit     History of Present Illness:   Since last OV in 1/25, patient has had colonoscopy with bx; see below for complete results.    The abdominal pain and borborygmi have improve.  Has 3 BM's daily, vary from dane to normal to occasional diarrhea.  No bleeding.        OV in 11/23:  Mitch Felder is a 53 y.o. male who presents to GI clinic for follow up of transverse colon stricture.  Since last OV in 7/23 with Rashida Lynch, patient has had surgery with Dr. Raymond in 8/23: Laparoscopic extended right  hemicolectomy, stapled side-to-side ileocolic anastomosis, and mobilization of splenic flexure.  Path showed CAC with fibrosis and stricture, but no cancer; pathologist favored IBD over ischemia.      He was having L-sided abdominal pain, diarrhea and bleeding when he was first seen by Ms. Lynch.  Pain, diarrhea, and bleeding have resolved after surgery.       OV in 7/23:  Patient following up to review test results.  This case was reviewed with Dr. Chapman, collaborating physician.  Patient initially was sent with Dr. Cardenas for a colonoscopy due to abnormal CT scan findings at [facility].  ->Colonoscopy 3/21/2023: Stricture in the descending colon. Biopsied. Patchy moderate inflammation found in the rectum and in the sigmoid colon. Biopsied. The distal rectum and anal verge are normal on retroflexion view. Repeat colonoscopy in 4 months to evaluate response to therapy.  Pathology returned positive for active chronic inflammation.  -> He was placed on a prednisone taper shortly after his colonoscopy/pathology results.  -> Repeat colonoscopy With Dr. Chapman 7/10/2023: 10 mm polyp in the sigmoid colon, removed with cold snare. Stricture at 80 cm proximal to the anus. Tattooed. Pseudopolyps in the proximal sigmoid colon and in the descending colon. Nonbleeding IH.  Pathology unremarkable  ->Barium enema 7/21/2023: Severe stricture of the transverse colon around the splenic flexure. No evidence of  bowel leak.     Patient denies constipation or diarrhea. No abdominal pain.  He has changed his diet. Eating more fiber and avoiding processed foods. Drinking more water throughout the day  Weight and appetite are stable per the patient.  He denies melena, hematochezia or hematemesis.  No family history of CRC or IBD    Review of Systems  Constitutional: denies fever/ chills, night sweats, wt loss and fatigue  Respiratory: denies SOB, GAGNON  CV: denies chest pain and LE edema  Neuro: denies weakness and difficulty walking      Past Medical History   has a past medical history of Other conditions influencing health status and Rash and other nonspecific skin eruption.     Problem List  Patient Active Problem List   Diagnosis    Abdominal pain    Acute sinusitis    Impetigo    Routine general medical examination at a health care facility    BMI 20.0-20.9, adult    Colon stricture (Multi)    Blood in stool    Abnormal CT scan, colon    Allergic rhinitis    Anemia    Asthma    Bloody stool    BRBPR (bright red blood per rectum)    Colitis    Cough    Dark stools    Diarrhea in adult patient    GERD (gastroesophageal reflux disease)    Injury, ankle    Intestinal infection    Rosacea    Sore throat           Past Surgical History  Past Surgical History:   Procedure Laterality Date    COLON SURGERY      RIGHT  HEMICOLECTOMY    COLONOSCOPY             Social History   reports that he has never smoked. He has never used smokeless tobacco. He reports that he does not currently use alcohol. He reports that he does not use drugs.     Family History  family history includes Heart disease in his father; No Known Problems in his mother.       Allergies  No Known Allergies    Medications  Current Outpatient Medications on File Prior to Visit   Medication Sig Dispense Refill    doxycycline (Monodox) 100 mg capsule Take 1 capsule (100 mg) by mouth once daily.       No current facility-administered medications on file prior to visit.  "            Objective   /85   Pulse 69   Ht 1.702 m (5' 7\")   Wt 59 kg (130 lb)   SpO2 99%   BMI 20.36 kg/m²        LABS     Component      Latest Ref Rng 11/16/2023   WBC      4.4 - 11.3 x10*3/uL 6.1    nRBC      0.0 - 0.0 /100 WBCs 0.0    RBC      4.50 - 5.90 x10*6/uL 4.13 (L)    HEMOGLOBIN      13.5 - 17.5 g/dL 12.2 (L)    HEMATOCRIT      41.0 - 52.0 % 38.6 (L)    MCV      80 - 100 fL 94    MCH      26.0 - 34.0 pg 29.5    MCHC      32.0 - 36.0 g/dL 31.6 (L)    RED CELL DISTRIBUTION WIDTH      11.5 - 14.5 % 13.1    Platelets      150 - 450 x10*3/uL 333           Calprotectin 11/23: 117  Calprotectin 1/24: 335    Radiology  CT A/P 1/25: Stricture is apparent in the colon just distal to the ileocolic  anastomosis over a length of about 2 cm.  No associated mass.      Endoscopy  Flex sig 11/23: stool prevented me from advancing to the anastomosis; rectosigmoid colon normal; no colitis on path    Colonoscopy 2/25:  normal mucosa in descending colon; mild inflammation in rectum and sigmoid; no stricture    FINAL DIAGNOSIS   A. TERMINAL ILEUM, BIOPSY:  - Small intestinal mucosa with no significant abnormality.     B. ILEOCOLONIC ANASTOMOSIS, BIOPSY:  - Small intestinal mucosa with mild active chronic enteritis.  - Negative for granulomas and dysplasia.     C. DESCENDING COLON, BIOPSY:  - Colonic mucosa with no significant abnormality.  - Negative for active inflammation, granulomas and dysplasia.     D. SIGMOID COLON, BIOPSY:  - Focally active chronic colitis.  - Negative for granulomas and dysplasia.     E. RECTUM, BIOPSY:  - Mildly active chronic colitis.  - Negative for granulomas and dysplasia.       Assessment/Plan   Mitch Felder is a 54 y.o. male who presents to GI clinic for colonic stricture, s/p lap extended R hemicolectomy in 8/23, and possible IBD as the etiology.  The most recent scope shows CAC, but no SB disease.  It is possible that the prior colonic stricture was due to Crohn's.  "     Colitis  Start Lialda daily.  Return to clinic in 4 months.  Check calprotectin then.  Colonoscopy for mucosal healing in 1 yr.           Joesph Chapman MD

## 2025-03-11 NOTE — PATIENT INSTRUCTIONS
Start Lialda daily.  Return to clinic in 4 months.  Check calprotectin then.  Colonoscopy for mucosal healing in 1 yr.

## 2025-03-29 ENCOUNTER — APPOINTMENT (OUTPATIENT)
Dept: RADIOLOGY | Facility: HOSPITAL | Age: 55
End: 2025-03-29
Payer: COMMERCIAL

## 2025-03-29 ENCOUNTER — HOSPITAL ENCOUNTER (EMERGENCY)
Facility: HOSPITAL | Age: 55
Discharge: HOME | End: 2025-03-29
Attending: EMERGENCY MEDICINE
Payer: COMMERCIAL

## 2025-03-29 VITALS
HEIGHT: 67 IN | BODY MASS INDEX: 20.4 KG/M2 | DIASTOLIC BLOOD PRESSURE: 73 MMHG | WEIGHT: 130 LBS | OXYGEN SATURATION: 99 % | HEART RATE: 67 BPM | RESPIRATION RATE: 16 BRPM | TEMPERATURE: 97.7 F | SYSTOLIC BLOOD PRESSURE: 138 MMHG

## 2025-03-29 DIAGNOSIS — R10.9 ABDOMINAL PAIN, UNSPECIFIED ABDOMINAL LOCATION: Primary | ICD-10-CM

## 2025-03-29 LAB
ALBUMIN SERPL BCP-MCNC: 3.9 G/DL (ref 3.4–5)
ALP SERPL-CCNC: 85 U/L (ref 33–120)
ALT SERPL W P-5'-P-CCNC: 13 U/L (ref 10–52)
ANION GAP SERPL CALC-SCNC: 12 MMOL/L (ref 10–20)
APPEARANCE UR: CLEAR
AST SERPL W P-5'-P-CCNC: 28 U/L (ref 9–39)
BASOPHILS # BLD AUTO: 0.06 X10*3/UL (ref 0–0.1)
BASOPHILS NFR BLD AUTO: 0.8 %
BILIRUB SERPL-MCNC: 0.5 MG/DL (ref 0–1.2)
BILIRUB UR STRIP.AUTO-MCNC: NEGATIVE MG/DL
BUN SERPL-MCNC: 10 MG/DL (ref 6–23)
CALCIUM SERPL-MCNC: 8.6 MG/DL (ref 8.6–10.3)
CHLORIDE SERPL-SCNC: 103 MMOL/L (ref 98–107)
CO2 SERPL-SCNC: 25 MMOL/L (ref 21–32)
COLOR UR: NORMAL
CREAT SERPL-MCNC: 1.02 MG/DL (ref 0.5–1.3)
EGFRCR SERPLBLD CKD-EPI 2021: 88 ML/MIN/1.73M*2
EOSINOPHIL # BLD AUTO: 0.1 X10*3/UL (ref 0–0.7)
EOSINOPHIL NFR BLD AUTO: 1.3 %
ERYTHROCYTE [DISTWIDTH] IN BLOOD BY AUTOMATED COUNT: 13 % (ref 11.5–14.5)
GLUCOSE SERPL-MCNC: 98 MG/DL (ref 74–99)
GLUCOSE UR STRIP.AUTO-MCNC: NORMAL MG/DL
HCT VFR BLD AUTO: 37.7 % (ref 41–52)
HGB BLD-MCNC: 11.9 G/DL (ref 13.5–17.5)
IMM GRANULOCYTES # BLD AUTO: 0.02 X10*3/UL (ref 0–0.7)
IMM GRANULOCYTES NFR BLD AUTO: 0.3 % (ref 0–0.9)
KETONES UR STRIP.AUTO-MCNC: NEGATIVE MG/DL
LEUKOCYTE ESTERASE UR QL STRIP.AUTO: NEGATIVE
LYMPHOCYTES # BLD AUTO: 1.39 X10*3/UL (ref 1.2–4.8)
LYMPHOCYTES NFR BLD AUTO: 17.9 %
MCH RBC QN AUTO: 29.8 PG (ref 26–34)
MCHC RBC AUTO-ENTMCNC: 31.6 G/DL (ref 32–36)
MCV RBC AUTO: 94 FL (ref 80–100)
MONOCYTES # BLD AUTO: 1.03 X10*3/UL (ref 0.1–1)
MONOCYTES NFR BLD AUTO: 13.3 %
NEUTROPHILS # BLD AUTO: 5.15 X10*3/UL (ref 1.2–7.7)
NEUTROPHILS NFR BLD AUTO: 66.4 %
NITRITE UR QL STRIP.AUTO: NEGATIVE
NRBC BLD-RTO: 0 /100 WBCS (ref 0–0)
PH UR STRIP.AUTO: 5.5 [PH]
PLATELET # BLD AUTO: 356 X10*3/UL (ref 150–450)
POTASSIUM SERPL-SCNC: 4.6 MMOL/L (ref 3.5–5.3)
PROT SERPL-MCNC: 7.3 G/DL (ref 6.4–8.2)
PROT UR STRIP.AUTO-MCNC: NEGATIVE MG/DL
RBC # BLD AUTO: 4 X10*6/UL (ref 4.5–5.9)
RBC # UR STRIP.AUTO: NEGATIVE MG/DL
SODIUM SERPL-SCNC: 135 MMOL/L (ref 136–145)
SP GR UR STRIP.AUTO: 1.02
UROBILINOGEN UR STRIP.AUTO-MCNC: NORMAL MG/DL
WBC # BLD AUTO: 7.8 X10*3/UL (ref 4.4–11.3)

## 2025-03-29 PROCEDURE — 96375 TX/PRO/DX INJ NEW DRUG ADDON: CPT

## 2025-03-29 PROCEDURE — 2500000004 HC RX 250 GENERAL PHARMACY W/ HCPCS (ALT 636 FOR OP/ED)

## 2025-03-29 PROCEDURE — 74177 CT ABD & PELVIS W/CONTRAST: CPT | Performed by: RADIOLOGY

## 2025-03-29 PROCEDURE — 81003 URINALYSIS AUTO W/O SCOPE: CPT | Performed by: STUDENT IN AN ORGANIZED HEALTH CARE EDUCATION/TRAINING PROGRAM

## 2025-03-29 PROCEDURE — 85025 COMPLETE CBC W/AUTO DIFF WBC: CPT | Performed by: STUDENT IN AN ORGANIZED HEALTH CARE EDUCATION/TRAINING PROGRAM

## 2025-03-29 PROCEDURE — 36415 COLL VENOUS BLD VENIPUNCTURE: CPT | Performed by: STUDENT IN AN ORGANIZED HEALTH CARE EDUCATION/TRAINING PROGRAM

## 2025-03-29 PROCEDURE — 96374 THER/PROPH/DIAG INJ IV PUSH: CPT | Mod: 59

## 2025-03-29 PROCEDURE — 74177 CT ABD & PELVIS W/CONTRAST: CPT

## 2025-03-29 PROCEDURE — 2550000001 HC RX 255 CONTRASTS

## 2025-03-29 PROCEDURE — 80053 COMPREHEN METABOLIC PANEL: CPT | Performed by: STUDENT IN AN ORGANIZED HEALTH CARE EDUCATION/TRAINING PROGRAM

## 2025-03-29 PROCEDURE — 99285 EMERGENCY DEPT VISIT HI MDM: CPT | Mod: 25 | Performed by: EMERGENCY MEDICINE

## 2025-03-29 PROCEDURE — 99285 EMERGENCY DEPT VISIT HI MDM: CPT | Performed by: EMERGENCY MEDICINE

## 2025-03-29 RX ORDER — ONDANSETRON HYDROCHLORIDE 2 MG/ML
4 INJECTION, SOLUTION INTRAVENOUS ONCE
Status: COMPLETED | OUTPATIENT
Start: 2025-03-29 | End: 2025-03-29

## 2025-03-29 RX ORDER — FENTANYL CITRATE 50 UG/ML
50 INJECTION, SOLUTION INTRAMUSCULAR; INTRAVENOUS ONCE
Status: COMPLETED | OUTPATIENT
Start: 2025-03-29 | End: 2025-03-29

## 2025-03-29 RX ORDER — DICYCLOMINE HYDROCHLORIDE 20 MG/1
20 TABLET ORAL 2 TIMES DAILY
Qty: 20 TABLET | Refills: 0 | Status: SHIPPED | OUTPATIENT
Start: 2025-03-29 | End: 2025-04-08

## 2025-03-29 RX ORDER — PREDNISONE 20 MG/1
40 TABLET ORAL DAILY
Qty: 10 TABLET | Refills: 0 | Status: SHIPPED | OUTPATIENT
Start: 2025-03-29 | End: 2025-04-03

## 2025-03-29 RX ADMIN — IOHEXOL 75 ML: 350 INJECTION, SOLUTION INTRAVENOUS at 14:22

## 2025-03-29 RX ADMIN — FENTANYL CITRATE 50 MCG: 50 INJECTION, SOLUTION INTRAMUSCULAR; INTRAVENOUS at 14:13

## 2025-03-29 RX ADMIN — ONDANSETRON 4 MG: 2 INJECTION INTRAMUSCULAR; INTRAVENOUS at 14:13

## 2025-03-29 ASSESSMENT — PAIN SCALES - GENERAL: PAINLEVEL_OUTOF10: 8

## 2025-03-29 ASSESSMENT — PAIN DESCRIPTION - LOCATION: LOCATION: ABDOMEN

## 2025-03-29 ASSESSMENT — COLUMBIA-SUICIDE SEVERITY RATING SCALE - C-SSRS
6. HAVE YOU EVER DONE ANYTHING, STARTED TO DO ANYTHING, OR PREPARED TO DO ANYTHING TO END YOUR LIFE?: NO
2. HAVE YOU ACTUALLY HAD ANY THOUGHTS OF KILLING YOURSELF?: NO
1. IN THE PAST MONTH, HAVE YOU WISHED YOU WERE DEAD OR WISHED YOU COULD GO TO SLEEP AND NOT WAKE UP?: NO

## 2025-03-29 ASSESSMENT — PAIN DESCRIPTION - PAIN TYPE: TYPE: ACUTE PAIN

## 2025-03-29 ASSESSMENT — PAIN - FUNCTIONAL ASSESSMENT: PAIN_FUNCTIONAL_ASSESSMENT: 0-10

## 2025-03-29 NOTE — ED PROVIDER NOTES
EMERGENCY DEPARTMENT ENCOUNTER      Pt Name: Mitch Felder  MRN: [id number]  Birthdate 1970  Date of evaluation: 3/29/2025  Provider: Jesus Owen MD    CHIEF COMPLAINT       Chief Complaint   Patient presents with    Abdominal Pain     Pt presents to the ED with midsection abdominal pain x 1 week. Pt states the pain is 8/10 and tender with palpation, pt also complains of bright red stools x 1 week. Pt denies nausea and vomiting.     HISTORY OF PRESENT ILLNESS    HPI  54-year-old male with past medical history of ulcerative colitis presents emergency department with 10 days of abdominal pain.  He claims the pain is worsening he has had diffuse diarrhea and dark-colored stools.  Review of systems otherwise negative.  Patient claims he recently had a colonoscopy that was otherwise normal.  Nursing Notes were reviewed.    PAST MEDICAL HISTORY     Past Medical History:   Diagnosis Date    Anemia     Asthma     BRBPR (bright red blood per rectum)     Colitis     Colon stricture (Multi)     Diarrhea     GERD (gastroesophageal reflux disease)     Rash and other nonspecific skin eruption     Rash    Rosacea     Wears glasses          SURGICAL HISTORY       Past Surgical History:   Procedure Laterality Date    COLON SURGERY      RIGHT  HEMICOLECTOMY    COLONOSCOPY           CURRENT MEDICATIONS       Previous Medications    DOXYCYCLINE (MONODOX) 100 MG CAPSULE    Take 1 capsule (100 mg) by mouth once daily.    MESALAMINE (LIALDA) 1.2 GRAM EC TABLET    Take 2 tablets (2.4 g) by mouth once daily. Do not crush, chew, or split.       ALLERGIES     Amoxicillin-pot clavulanate and Milk    FAMILY HISTORY       Family History   Problem Relation Name Age of Onset    No Known Problems Mother      Heart disease Father            SOCIAL HISTORY       Social History     Socioeconomic History    Marital status: Single   Tobacco Use    Smoking status: Former     Current packs/day: 0.00     Types: Cigarettes     Quit date: 6/1/2023      Years since quittin.8    Smokeless tobacco: Never   Vaping Use    Vaping status: Never Used   Substance and Sexual Activity    Alcohol use: Yes     Comment: Occasional    Drug use: Never    Sexual activity: Defer       SCREENINGS                        PHYSICAL EXAM    (up to 7 for level 4, 8 or more for level 5)     ED Triage Vitals [25 1247]   Temperature Heart Rate Respirations BP   36.7 °C (98.1 °F) 63 18 110/72      Pulse Ox Temp Source Heart Rate Source Patient Position   100 % Temporal Monitor Sitting      BP Location FiO2 (%)     Right arm --       Physical Exam  Constitutional:       Appearance: He is well-developed.   HENT:      Head: Normocephalic and atraumatic.      Mouth/Throat:      Mouth: Mucous membranes are moist.   Eyes:      Extraocular Movements: Extraocular movements intact.      Pupils: Pupils are equal, round, and reactive to light.   Cardiovascular:      Rate and Rhythm: Normal rate and regular rhythm.      Heart sounds: Normal heart sounds.   Pulmonary:      Effort: Pulmonary effort is normal.      Breath sounds: Normal breath sounds.   Abdominal:      General: Abdomen is flat. Bowel sounds are normal.      Palpations: Abdomen is soft.      Tenderness: There is generalized abdominal tenderness and tenderness in the epigastric area.   Skin:     General: Skin is warm.      Capillary Refill: Capillary refill takes less than 2 seconds.   Neurological:      General: No focal deficit present.      Mental Status: He is alert.   Psychiatric:         Mood and Affect: Mood normal.          DIAGNOSTIC RESULTS     LABS:  Labs Reviewed   CBC WITH AUTO DIFFERENTIAL - Abnormal       Result Value    WBC 7.8      nRBC 0.0      RBC 4.00 (*)     Hemoglobin 11.9 (*)     Hematocrit 37.7 (*)     MCV 94      MCH 29.8      MCHC 31.6 (*)     RDW 13.0      Platelets 356      Neutrophils % 66.4      Immature Granulocytes %, Automated 0.3      Lymphocytes % 17.9      Monocytes % 13.3      Eosinophils % 1.3       Basophils % 0.8      Neutrophils Absolute 5.15      Immature Granulocytes Absolute, Automated 0.02      Lymphocytes Absolute 1.39      Monocytes Absolute 1.03 (*)     Eosinophils Absolute 0.10      Basophils Absolute 0.06     COMPREHENSIVE METABOLIC PANEL - Abnormal    Glucose 98      Sodium 135 (*)     Potassium 4.6      Chloride 103      Bicarbonate 25      Anion Gap 12      Urea Nitrogen 10      Creatinine 1.02      eGFR 88      Calcium 8.6      Albumin 3.9      Alkaline Phosphatase 85      Total Protein 7.3      AST 28      Bilirubin, Total 0.5      ALT 13     URINALYSIS WITH REFLEX CULTURE AND MICROSCOPIC - Normal    Color, Urine Light-Yellow      Appearance, Urine Clear      Specific Gravity, Urine 1.016      pH, Urine 5.5      Protein, Urine NEGATIVE      Glucose, Urine Normal      Blood, Urine NEGATIVE      Ketones, Urine NEGATIVE      Bilirubin, Urine NEGATIVE      Urobilinogen, Urine Normal      Nitrite, Urine NEGATIVE      Leukocyte Esterase, Urine NEGATIVE     URINALYSIS WITH REFLEX CULTURE AND MICROSCOPIC    Narrative:     The following orders were created for panel order Urinalysis with Reflex Culture and Microscopic.  Procedure                               Abnormality         Status                     ---------                               -----------         ------                     Urinalysis with Reflex C...[[id number]]  Normal              Final result               Extra Urine Gray Tube[[id number]]                            In process                   Please view results for these tests on the individual orders.   EXTRA URINE GRAY TUBE       All other labs were within normal range or not returned as of this dictation.    Imaging  CT abdomen pelvis w IV contrast   Final Result   1. Postoperative changes, as above.   2. Diffuse wall thickening throughout the rectosigmoid colon,   nonspecific in appearance.   3. No evidence of bowel obstruction, free intraperitoneal air or   abnormal  intra-abdominal fluid collection.        MACRO:   None        Signed by: Fabián Parks 3/29/2025 3:05 PM   Dictation workstation:   [id number]           Procedures  Procedures     EMERGENCY DEPARTMENT COURSE/MDM:   Medical Decision Making  54-year-old male presents emergency department with chief complaint of abdominal pain.  Medical management treatment emergency department due to rule out any possible infectious source intra-abdominal pathology.  Performed basic labs and CT scan provide pain management for the patient.  The patient will most likely need a steroid burst. CT scan shows-  IMPRESSION:  1. Postoperative changes, as above.  2. Diffuse wall thickening throughout the rectosigmoid colon,  nonspecific in appearance.  3. No evidence of bowel obstruction, free intraperitoneal air or  abnormal intra-abdominal fluid collection.Taper.  Will discharge the patient home with prednisone 40 mg for 5 days.  We have informed the patient of the results and indicated that he needs to follow-up with GI.            Patient and or family in agreement and understanding of treatment plan.  All questions answered.      I reviewed the case with the attending ED physician. The attending ED physician agrees with the plan. Patient and/or patient´s representative was counseled regarding labs, imaging, likely diagnosis, and plan. All questions were answered.    ED Medications administered this visit:    Medications   fentaNYL PF (Sublimaze) injection 50 mcg (50 mcg intravenous Given 3/29/25 1413)   ondansetron (Zofran) injection 4 mg (4 mg intravenous Given 3/29/25 1413)   iohexol (OMNIPaque) 350 mg iodine/mL solution 75 mL (75 mL intravenous Given 3/29/25 1422)       New Prescriptions from this visit:    New Prescriptions    No medications on file       Follow-up:  No follow-up provider specified.      Final Impression: No diagnosis found.      (Please note that portions of this note were completed with a voice recognition  program.  Efforts were made to edit the dictations but occasionally words are mis-transcribed.)     Jesus Owen MD  Resident  03/29/25 [id number]

## 2025-03-29 NOTE — DISCHARGE INSTRUCTIONS
You are seen the emergency department today for evaluation of abdominal pain.  You are most likely experiencing an ulcerative colitis flare.  CT scan and labs were unremarkable.  We have sent prednisone and Bentyl to your local pharmacy please pick it up and take it as needed.  Please follow with your primary care provider within the week please follow-up with GI within the week as well.  If you develop any worsening pain nausea vomiting please return back to emergency department soon as possible.

## 2025-03-30 LAB — HOLD SPECIMEN: NORMAL

## 2025-04-08 ENCOUNTER — TELEPHONE (OUTPATIENT)
Dept: GASTROENTEROLOGY | Facility: CLINIC | Age: 55
End: 2025-04-08

## 2025-04-08 ENCOUNTER — APPOINTMENT (OUTPATIENT)
Dept: PRIMARY CARE | Facility: CLINIC | Age: 55
End: 2025-04-08
Payer: COMMERCIAL

## 2025-04-08 VITALS
HEIGHT: 67 IN | DIASTOLIC BLOOD PRESSURE: 60 MMHG | HEART RATE: 61 BPM | WEIGHT: 131 LBS | SYSTOLIC BLOOD PRESSURE: 110 MMHG | TEMPERATURE: 98.1 F | OXYGEN SATURATION: 99 % | BODY MASS INDEX: 20.56 KG/M2

## 2025-04-08 DIAGNOSIS — K52.9 COLITIS: Primary | ICD-10-CM

## 2025-04-08 DIAGNOSIS — K21.9 GASTROESOPHAGEAL REFLUX DISEASE, UNSPECIFIED WHETHER ESOPHAGITIS PRESENT: ICD-10-CM

## 2025-04-08 DIAGNOSIS — K62.5 BRBPR (BRIGHT RED BLOOD PER RECTUM): ICD-10-CM

## 2025-04-08 PROCEDURE — 3008F BODY MASS INDEX DOCD: CPT | Performed by: NURSE PRACTITIONER

## 2025-04-08 PROCEDURE — 99214 OFFICE O/P EST MOD 30 MIN: CPT | Performed by: NURSE PRACTITIONER

## 2025-04-08 RX ORDER — PREDNISONE 20 MG/1
40 TABLET ORAL DAILY
Qty: 30 TABLET | Refills: 0 | Status: SHIPPED | OUTPATIENT
Start: 2025-04-08

## 2025-04-08 NOTE — PROGRESS NOTES
Subjective   Patient ID: Mitch Felder is a 54 y.o. male who presents for Establish Care and ER Follow-up.    HPI entered by Medical Assistant and reviewed/updated by myself.    Patient presents in the office today to establish care. Patient was previously seen by Dr. Marques. Patient last seen this physician January 2025. Patient heard about us through seen in the office.  Patients last eye exam was 4-5 years ago.  Patients last dental appointment was 4-5 years ago.    ER follow up at [facility] for abdominal pain, bright red stools 3/29/2025  10 Days since discharge  Patient had CMP CBC, UA, CT abdominal pain  While in ER given Sublimaze, Zofran, Omnipaque  Patient advised to follow up with GI as soon as possible -  currently scheduled for 7/22/25 for routine f/up with Dr. Chapman. Last visit 3/11/25, started on Lialda for colitis, started to have GIB a few days later.  Patient was prescribed Bentyl and Deltasone.    Patient states he is feeling better since being in the ER.  Still having BRB with most BM. Denies feeling weak or dizzy. Denies hemorrhoids.  Has been on liquid diet since ED per their orders - soups, applesauce, yogurt, etc.  Denies weight loss prior to onset of symptoms. Has been having gas. Has GERD.  Has not been having abd pain since discharge.  Hx of right hemicolectomy     The patient's allergies, medications, and history were reviewed with them today and updated as indicated.    Review of Systems   Constitutional:  Negative for appetite change, chills, diaphoresis, fatigue, fever and unexpected weight change.   Respiratory:  Negative for cough, chest tightness, shortness of breath and wheezing.    Cardiovascular:  Negative for chest pain, palpitations and leg swelling.   Gastrointestinal:  Positive for abdominal pain, blood in stool and diarrhea. Negative for constipation.   Musculoskeletal:  Negative for arthralgias, back pain and myalgias.   Skin:  Negative for rash and wound.   Neurological:   "Negative for dizziness, syncope, facial asymmetry and headaches.   Psychiatric/Behavioral:  Negative for confusion. The patient is not nervous/anxious.       Objective   Vital Signs: /60 (BP Location: Right arm, Patient Position: Sitting)   Pulse 61   Temp 36.7 °C (98.1 °F) (Temporal)   Ht 1.702 m (5' 7\")   Wt 59.4 kg (131 lb)   SpO2 99%   BMI 20.52 kg/m²     Physical Exam  Vitals and nursing note reviewed.   Constitutional:       General: He is not in acute distress.     Appearance: Normal appearance. He is not ill-appearing.   HENT:      Head: Normocephalic and atraumatic.      Right Ear: External ear normal.      Left Ear: External ear normal.      Nose: No congestion or rhinorrhea.      Mouth/Throat:      Mouth: Mucous membranes are moist.      Pharynx: No oropharyngeal exudate or posterior oropharyngeal erythema.   Eyes:      General:         Right eye: No discharge.         Left eye: No discharge.      Extraocular Movements: Extraocular movements intact.      Conjunctiva/sclera: Conjunctivae normal.      Pupils: Pupils are equal, round, and reactive to light.   Cardiovascular:      Rate and Rhythm: Normal rate and regular rhythm.      Heart sounds: No murmur heard.  Pulmonary:      Effort: Pulmonary effort is normal. No respiratory distress.      Breath sounds: Normal breath sounds and air entry.   Abdominal:      General: Bowel sounds are normal.      Palpations: Abdomen is soft.      Tenderness: There is no abdominal tenderness. There is no guarding.   Musculoskeletal:      Right lower leg: No edema.      Left lower leg: No edema.   Skin:     General: Skin is warm and dry.      Coloration: Skin is not jaundiced.      Findings: No erythema or rash.   Neurological:      General: No focal deficit present.      Mental Status: He is alert. Mental status is at baseline.   Psychiatric:         Mood and Affect: Mood normal.         Behavior: Behavior normal.         Thought Content: Thought content " normal.     POCT today: No results found for this visit on 04/08/25.     Assessment & Plan  1. Colitis  Patient here to follow-up on ED visit for bright red bleeding per rectum which started a few days after starting new medication Lialda per GI.  I advised he stop this medication until he can follow-up with his GI physician Dr. Chapman.  Since he is continuing to report BRB, our office will call GI to facilitate appointment ASAP as well as clarify patient's current diet.  He has been on a full liquid diet for the past week per ED discharge instructions apparently in case of pending EGD/colonoscopy.  Discussed warning signs for which she should return to ED.  Repeat CBC today to assess trend.  Patient verbalized understanding and is agreeable to plan.  Addendum: Further instructions/medications provided to patient by Dr. Chapman.  Follow-up appointment 4/22/25.      2. BRBPR (bright red blood per rectum)  CBC and Auto Differential      3. Gastroesophageal reflux disease, unspecified whether esophagitis present        4. BMI 20.0-20.9, adult        Reviewed/discussed treatment options and health maintenance. Take medications as prescribed. We will contact you with the results of any ordered testing; please send a [employer] message or call the office if you do not hear from us. Follow up in the office with your PCP JUAN Dudley DNP as already discussed/scheduled and as needed. Return sooner if symptoms do not resolve as expected.     JUAN Ndiaye DNP  Family Nurse Practitioner  [facility]

## 2025-04-08 NOTE — TELEPHONE ENCOUNTER
Cande Hough's office called on this patient. This patient was just seen in their office and recently seen in the ER for GI bleed and is still actively bleeding. They want him to see Dr. Chapman as soon as possible.

## 2025-04-08 NOTE — TELEPHONE ENCOUNTER
Called pt.  PCP stopped Lialda.  Agree with that as this could be intolerance/paradoxical rx to Lialda.  Medication e-scripted to pharmacy-prednisone.  He was only given 5 days in the ER.  He will follow up with me on 4/22 at noon.    Joesph Chapman MD

## 2025-04-09 ENCOUNTER — TELEPHONE (OUTPATIENT)
Dept: GASTROENTEROLOGY | Facility: CLINIC | Age: 55
End: 2025-04-09
Payer: COMMERCIAL

## 2025-04-09 NOTE — TELEPHONE ENCOUNTER
I left a message to call [phone] to schedule an appointment for Tuesday, 4/22/25 at 12:00 pm per Dr. Chapman.

## 2025-04-18 ASSESSMENT — ENCOUNTER SYMPTOMS
UNEXPECTED WEIGHT CHANGE: 0
DIAPHORESIS: 0
WHEEZING: 0
CHEST TIGHTNESS: 0
HEADACHES: 0
DIZZINESS: 0
DIARRHEA: 1
ABDOMINAL PAIN: 1
WOUND: 0
ARTHRALGIAS: 0
BACK PAIN: 0
BLOOD IN STOOL: 1
PALPITATIONS: 0
CONFUSION: 0
CHILLS: 0
FATIGUE: 0
SHORTNESS OF BREATH: 0
MYALGIAS: 0
COUGH: 0
CONSTIPATION: 0
FACIAL ASYMMETRY: 0
APPETITE CHANGE: 0
FEVER: 0
NERVOUS/ANXIOUS: 0

## 2025-04-22 ENCOUNTER — APPOINTMENT (OUTPATIENT)
Dept: GASTROENTEROLOGY | Facility: CLINIC | Age: 55
End: 2025-04-22
Payer: COMMERCIAL

## 2025-04-22 VITALS
BODY MASS INDEX: 20.09 KG/M2 | DIASTOLIC BLOOD PRESSURE: 82 MMHG | SYSTOLIC BLOOD PRESSURE: 124 MMHG | WEIGHT: 128 LBS | HEIGHT: 67 IN | OXYGEN SATURATION: 98 % | HEART RATE: 75 BPM

## 2025-04-22 DIAGNOSIS — K52.9 COLITIS: Primary | ICD-10-CM

## 2025-04-22 PROCEDURE — 99214 OFFICE O/P EST MOD 30 MIN: CPT | Performed by: INTERNAL MEDICINE

## 2025-04-22 PROCEDURE — 3008F BODY MASS INDEX DOCD: CPT | Performed by: INTERNAL MEDICINE

## 2025-04-22 NOTE — PATIENT INSTRUCTIONS
Labs as ordered.  Continue to stay off Lialda.  No need for steroids.  Schedule ECG.  Get shingles vaccine.  Will start Velsipity if ECG OK, and he gets shingles vaccine.  Return to clinic in 2 months.

## 2025-04-22 NOTE — ASSESSMENT & PLAN NOTE
Labs as ordered.  Continue to stay off Lialda.  No need for steroids.  Schedule ECG.  Get shingles vaccine.  Will start Velsipity if ECG OK, and he gets shingles vaccine.

## 2025-04-22 NOTE — PROGRESS NOTES
Gastroenterology Office Visit     History of Present Illness:   Since last OV in 3/25, patient's Lialda was discontinued, and he was started on prednisone.     Never took the prednisone.  Bleeding has stopped with discontinuation of Lialda.  Has two loose stools daily.  No pain.     Denies h/o DM and uveitis.  Does not know if he ever had ECG.  Has not had shingles vaccine.        OV on 3/11/25:  Since last OV in 1/25, patient has had colonoscopy with bx; see below for complete results.    The abdominal pain and borborygmi have improve.  Has 3 BM's daily, vary from dane to normal to occasional diarrhea.  No bleeding.        OV in 11/23:  Mitch Felder is a 53 y.o. male who presents to GI clinic for follow up of transverse colon stricture.  Since last OV in 7/23 with Rashida Lynch, patient has had surgery with Dr. Raymond in 8/23: Laparoscopic extended right  hemicolectomy, stapled side-to-side ileocolic anastomosis, and mobilization of splenic flexure.  Path showed CAC with fibrosis and stricture, but no cancer; pathologist favored IBD over ischemia.      He was having L-sided abdominal pain, diarrhea and bleeding when he was first seen by Ms. Lynch.  Pain, diarrhea, and bleeding have resolved after surgery.       OV in 7/23:  Patient following up to review test results.  This case was reviewed with Dr. Chapman, collaborating physician.  Patient initially was sent with Dr. Cardenas for a colonoscopy due to abnormal CT scan findings at [facility].  ->Colonoscopy 3/21/2023: Stricture in the descending colon. Biopsied. Patchy moderate inflammation found in the rectum and in the sigmoid colon. Biopsied. The distal rectum and anal verge are normal on retroflexion view. Repeat colonoscopy in 4 months to evaluate response to therapy.  Pathology returned positive for active chronic inflammation.  -> He was placed on a prednisone taper shortly after his colonoscopy/pathology results.  -> Repeat colonoscopy With Dr. Chapman  "7/10/2023: 10 mm polyp in the sigmoid colon, removed with cold snare. Stricture at 80 cm proximal to the anus. Tattooed. Pseudopolyps in the proximal sigmoid colon and in the descending colon. Nonbleeding IH.  Pathology unremarkable  ->Barium enema 7/21/2023: Severe stricture of the transverse colon around the splenic flexure. No evidence of bowel leak.     Patient denies constipation or diarrhea. No abdominal pain.  He has changed his diet. Eating more fiber and avoiding processed foods. Drinking more water throughout the day  Weight and appetite are stable per the patient.  He denies melena, hematochezia or hematemesis.  No family history of CRC or IBD    Review of Systems  Constitutional: denies fever/ chills, night sweats, wt loss and fatigue  Respiratory: denies SOB, GAGNON  CV: denies chest pain and LE edema  Neuro: denies weakness and difficulty walking      Past Medical History   has a past medical history of Other conditions influencing health status and Rash and other nonspecific skin eruption.     Problem List  Problem List[1]          Past Surgical History  Past Surgical History:   Procedure Laterality Date    COLON SURGERY      RIGHT  HEMICOLECTOMY    COLONOSCOPY             Social History   reports that he has never smoked. He has never used smokeless tobacco. He reports that he does not currently use alcohol. He reports that he does not use drugs.     Family History  family history includes Heart disease in his father; No Known Problems in his mother.       Allergies  No Known Allergies    Medications  Medications Ordered Prior to Encounter[2]          Objective   /82   Pulse 75   Ht 1.702 m (5' 7\")   Wt 58.1 kg (128 lb)   SpO2 98%   BMI 20.05 kg/m²       LABS     Component      Latest Ref Rng 3/29/2025   Albumin      3.4 - 5.0 g/dL 3.9    Alkaline Phosphatase      33 - 120 U/L 85    Total Protein      6.4 - 8.2 g/dL 7.3    AST      9 - 39 U/L 28    Bilirubin Total      0.0 - 1.2 mg/dL 0.5  "   ALT      10 - 52 U/L 13        Component      Latest Ref Rng 3/29/2025   WBC      4.4 - 11.3 x10*3/uL 7.8    nRBC      0.0 - 0.0 /100 WBCs 0.0    RBC      4.50 - 5.90 x10*6/uL 4.00 (L)    HEMOGLOBIN      13.5 - 17.5 g/dL 11.9 (L)    HEMATOCRIT      41.0 - 52.0 % 37.7 (L)    MCV      80 - 100 fL 94    MCH      26.0 - 34.0 pg 29.8    MCHC      32.0 - 36.0 g/dL 31.6 (L)    RED CELL DISTRIBUTION WIDTH      11.5 - 14.5 % 13.0    Platelets      150 - 450 x10*3/uL 356       Component      Latest Ref Rng 3/29/2025   Neutrophils Absolute      1.20 - 7.70 x10*3/uL 5.15    Immature Granulocytes Absolute, Automated      0.00 - 0.70 x10*3/uL 0.02    Lymphocytes Absolute      1.20 - 4.80 x10*3/uL 1.39            Calprotectin 11/23: 117  Calprotectin 1/24: 335    Radiology  CT A/P 1/25: Stricture is apparent in the colon just distal to the ileocolic  anastomosis over a length of about 2 cm.  No associated mass.      Endoscopy  Flex sig 11/23: stool prevented me from advancing to the anastomosis; rectosigmoid colon normal; no colitis on path    Colonoscopy 2/25:  normal mucosa in descending colon; mild inflammation in rectum and sigmoid; no stricture    FINAL DIAGNOSIS   A. TERMINAL ILEUM, BIOPSY:  - Small intestinal mucosa with no significant abnormality.     B. ILEOCOLONIC ANASTOMOSIS, BIOPSY:  - Small intestinal mucosa with mild active chronic enteritis.  - Negative for granulomas and dysplasia.     C. DESCENDING COLON, BIOPSY:  - Colonic mucosa with no significant abnormality.  - Negative for active inflammation, granulomas and dysplasia.     D. SIGMOID COLON, BIOPSY:  - Focally active chronic colitis.  - Negative for granulomas and dysplasia.     E. RECTUM, BIOPSY:  - Mildly active chronic colitis.  - Negative for granulomas and dysplasia.       Assessment/Plan   Mitch Felder is a 54 y.o. male who presents to GI clinic for colonic stricture, s/p lap extended R hemicolectomy in 8/23, and possible IBD as the etiology.  The  most recent scope shows CAC, but no SB disease.  It is possible that the prior colonic stricture was due to Crohn's.  He had a paradoxical reaction/ intolerance to 5-ASA with increased bleeding.  This has resolved with discontinuation.  Mild sx's now.     Colitis  Labs as ordered.  Continue to stay off Lialda.  No need for steroids.  Schedule ECG.  Get shingles vaccine.  Will start Velsipity if ECG OK, and he gets shingles vaccine.              Joesph Chapman MD            [1]   Patient Active Problem List  Diagnosis    Abdominal pain    Acute sinusitis    Impetigo    Routine general medical examination at a health care facility    BMI 20.0-20.9, adult    Colon stricture (Multi)    Blood in stool    Abnormal CT scan, colon    Allergic rhinitis    Anemia    Asthma    Bloody stool    BRBPR (bright red blood per rectum)    Colitis    Cough    Dark stools    Diarrhea in adult patient    GERD (gastroesophageal reflux disease)    Injury, ankle    Intestinal infection    Rosacea    Sore throat   [2]   Current Outpatient Medications on File Prior to Visit   Medication Sig Dispense Refill    doxycycline (Monodox) 100 mg capsule Take 1 capsule (100 mg) by mouth once daily.      [DISCONTINUED] predniSONE (Deltasone) 20 mg tablet Take 2 tablets (40 mg) by mouth once daily. 30 tablet 0     No current facility-administered medications on file prior to visit.

## 2025-04-23 ENCOUNTER — TELEPHONE (OUTPATIENT)
Dept: GASTROENTEROLOGY | Facility: CLINIC | Age: 55
End: 2025-04-23
Payer: COMMERCIAL

## 2025-04-23 ENCOUNTER — SPECIALTY PHARMACY (OUTPATIENT)
Dept: PHARMACY | Facility: CLINIC | Age: 55
End: 2025-04-23

## 2025-04-23 DIAGNOSIS — K52.9 COLITIS: ICD-10-CM

## 2025-04-23 RX ORDER — ETRASIMOD 2 MG/1
2 TABLET, FILM COATED ORAL DAILY
Qty: 30 TABLET | Refills: 11 | Status: SHIPPED | OUTPATIENT
Start: 2025-04-23

## 2025-04-23 NOTE — TELEPHONE ENCOUNTER
I discussed the new medication, Velsipity, with the patient, including the dosing schedule and potential side effects . We also reviewed the authorization process and provided information on co pay assistance programs to support the patient's treatment plan.     Patient will call to scheduled ECG and also have shingles vaccine. I will notify him if Velsipity approved/denied and if velsipity for me paperwork needs to be completed. Pt verbalized understanding.

## 2025-04-24 ENCOUNTER — SPECIALTY PHARMACY (OUTPATIENT)
Dept: PHARMACY | Facility: CLINIC | Age: 55
End: 2025-04-24

## 2025-04-29 ENCOUNTER — TELEPHONE (OUTPATIENT)
Dept: PRIMARY CARE | Facility: CLINIC | Age: 55
End: 2025-04-29
Payer: COMMERCIAL

## 2025-04-29 DIAGNOSIS — Z12.11 SCREENING FOR COLON CANCER: ICD-10-CM

## 2025-05-03 LAB — CALPROTECTIN STL-MCNT: 94 MCG/G

## 2025-05-05 ENCOUNTER — TELEPHONE (OUTPATIENT)
Dept: GASTROENTEROLOGY | Facility: CLINIC | Age: 55
End: 2025-05-05
Payer: COMMERCIAL

## 2025-05-05 ENCOUNTER — PATIENT MESSAGE (OUTPATIENT)
Dept: GASTROENTEROLOGY | Facility: CLINIC | Age: 55
End: 2025-05-05
Payer: COMMERCIAL

## 2025-05-05 NOTE — TELEPHONE ENCOUNTER
Patient had ECG baseline assessment completed through [employer] for me program. They will fax office results. PA pending through patients new insurance.

## 2025-05-15 ENCOUNTER — TELEPHONE (OUTPATIENT)
Dept: GASTROENTEROLOGY | Facility: CLINIC | Age: 55
End: 2025-05-15
Payer: COMMERCIAL

## 2025-05-15 NOTE — TELEPHONE ENCOUNTER
Pt Velsipity being delivered today - coming from [employer]. Pt getting Shingles Vaccine soon.

## 2025-06-04 ENCOUNTER — TELEPHONE (OUTPATIENT)
Dept: GASTROENTEROLOGY | Facility: CLINIC | Age: 55
End: 2025-06-04
Payer: COMMERCIAL

## 2025-06-04 NOTE — TELEPHONE ENCOUNTER
No.  We know what the bleeding is from- his colitis.  Where are we with his Velsipity approval?  Joesph Chapman MD

## 2025-06-04 NOTE — TELEPHONE ENCOUNTER
Patient called in to NR office:  States that he is having blood in his stool, and is constipated. He currently is scheduled for an appt with Dr. Chapman on 06/24/2025 AND 07/22/2025. Not sure if he should come in sooner, or what Dr. Chapman would recommend.

## 2025-06-05 ENCOUNTER — TELEPHONE (OUTPATIENT)
Dept: GASTROENTEROLOGY | Facility: CLINIC | Age: 55
End: 2025-06-05
Payer: COMMERCIAL

## 2025-06-05 NOTE — TELEPHONE ENCOUNTER
Pt started velsipity on 5/15. States he has been mostly compliant . He is moving his bowels 4-5x per day. Stools are small and he has some straining. He has some BRB on the stools . Denies abd pain, nausea, or vomiting.     Patient aware velsipity may take more time to work. Urged med compliance . He will try miralax to help keep stools soft . He was given nurse direct phone number to call if further concerns or questions. He has follow up scheduled on 6/24.

## 2025-06-05 NOTE — TELEPHONE ENCOUNTER
Biologic medication update    Velsipity start date : 5/15/2025  Supplied by : Velsipity for [facility] drug program  Compliance : Patient states he has been mostly compliant taking the medication every day.   Follow up visit : 6/24/2025 with Dr. Chapman

## 2025-06-11 ENCOUNTER — TELEPHONE (OUTPATIENT)
Dept: PRIMARY CARE | Facility: CLINIC | Age: 55
End: 2025-06-11

## 2025-06-11 ENCOUNTER — OFFICE VISIT (OUTPATIENT)
Dept: PRIMARY CARE | Facility: CLINIC | Age: 55
End: 2025-06-11

## 2025-06-11 VITALS
RESPIRATION RATE: 18 BRPM | TEMPERATURE: 98.3 F | HEIGHT: 67 IN | SYSTOLIC BLOOD PRESSURE: 98 MMHG | WEIGHT: 129.4 LBS | HEART RATE: 85 BPM | OXYGEN SATURATION: 94 % | DIASTOLIC BLOOD PRESSURE: 58 MMHG | BODY MASS INDEX: 20.31 KG/M2

## 2025-06-11 DIAGNOSIS — R50.9 FEVER, UNSPECIFIED FEVER CAUSE: Primary | ICD-10-CM

## 2025-06-11 PROCEDURE — 3008F BODY MASS INDEX DOCD: CPT | Performed by: NURSE PRACTITIONER

## 2025-06-11 PROCEDURE — 99212 OFFICE O/P EST SF 10 MIN: CPT | Performed by: NURSE PRACTITIONER

## 2025-06-11 RX ORDER — AZITHROMYCIN 250 MG/1
250 TABLET, FILM COATED ORAL DAILY
Qty: 6 TABLET | Refills: 0 | Status: SHIPPED | OUTPATIENT
Start: 2025-06-11 | End: 2025-06-12 | Stop reason: SDUPTHER

## 2025-06-11 ASSESSMENT — ENCOUNTER SYMPTOMS
CHEST TIGHTNESS: 0
ABDOMINAL PAIN: 0
APPETITE CHANGE: 1
DIAPHORESIS: 1
UNEXPECTED WEIGHT CHANGE: 0
MYALGIAS: 0
FATIGUE: 1
CHILLS: 1
FEVER: 1
DIZZINESS: 0
CONSTIPATION: 0
SHORTNESS OF BREATH: 0
CONFUSION: 0
HEADACHES: 0
BACK PAIN: 0
DIARRHEA: 0
COUGH: 0
PALPITATIONS: 0
FACIAL ASYMMETRY: 0
WHEEZING: 0
ARTHRALGIAS: 0
NERVOUS/ANXIOUS: 0
WOUND: 0
DEPRESSION: 0

## 2025-06-11 ASSESSMENT — PATIENT HEALTH QUESTIONNAIRE - PHQ9
1. LITTLE INTEREST OR PLEASURE IN DOING THINGS: NOT AT ALL
2. FEELING DOWN, DEPRESSED OR HOPELESS: NOT AT ALL
SUM OF ALL RESPONSES TO PHQ9 QUESTIONS 1 AND 2: 0

## 2025-06-11 NOTE — LETTER
June 11, 2025     Patient: Mitch Felder   YOB: 1970   Date of Visit: 6/11/2025       To Whom It May Concern:    Mitch Felder was seen in my clinic on 6/11/2025 for a virus. Please excuse Mitch for his absence from work from 6/10/2025-6/13/2025 and may return on 6/16/2025.    If you have any questions or concerns, please don't hesitate to call.         Sincerely,         Cande Hough, APRN-CNP, DNP        CC: No Recipients

## 2025-06-11 NOTE — PROGRESS NOTES
"Subjective   Patient ID: Mitch Felder is a 55 y.o. male who presents for Chills and Fever.    HPI entered by Medical Assistant and reviewed/updated by myself.    Patient presents in office for fever, chills. Patient states it started yesterday. Today feels the same as yesterday. No other symptoms at this time. No OTC treatment. Has been visiting family at [facility] recently and is concerned he picked up an illness there.    The patient's allergies, medications, and history were reviewed with them today and updated as indicated.    Review of Systems   Constitutional:  Positive for appetite change (decreased), chills, diaphoresis, fatigue and fever. Negative for unexpected weight change.   Respiratory:  Negative for cough, chest tightness, shortness of breath and wheezing.    Cardiovascular:  Negative for chest pain, palpitations and leg swelling.   Gastrointestinal:  Negative for abdominal pain, constipation and diarrhea.   Musculoskeletal:  Negative for arthralgias, back pain and myalgias.   Skin:  Negative for rash and wound.   Neurological:  Negative for dizziness, syncope, facial asymmetry and headaches.   Psychiatric/Behavioral:  Negative for confusion. The patient is not nervous/anxious.       Objective   Vital Signs: BP 98/58 (BP Location: Right arm, Patient Position: Sitting, BP Cuff Size: Adult)   Pulse 85   Temp 36.8 °C (98.3 °F) (Temporal)   Resp 18   Ht 1.702 m (5' 7\")   Wt 58.7 kg (129 lb 6.4 oz)   SpO2 94%   BMI 20.27 kg/m²     Physical Exam  Vitals and nursing note reviewed.   Constitutional:       General: He is not in acute distress.     Appearance: Normal appearance. He is not ill-appearing.   HENT:      Head: Normocephalic and atraumatic.      Right Ear: Tympanic membrane, ear canal and external ear normal.      Left Ear: Tympanic membrane, ear canal and external ear normal.      Nose: No mucosal edema, congestion or rhinorrhea.      Mouth/Throat:      Mouth: Mucous membranes are " moist.      Tongue: No lesions.      Palate: No lesions.      Pharynx: No pharyngeal swelling, oropharyngeal exudate, posterior oropharyngeal erythema or uvula swelling.      Tonsils: No tonsillar exudate.   Eyes:      General:         Right eye: No discharge.         Left eye: No discharge.      Extraocular Movements: Extraocular movements intact.      Conjunctiva/sclera: Conjunctivae normal.      Pupils: Pupils are equal, round, and reactive to light.   Cardiovascular:      Rate and Rhythm: Normal rate and regular rhythm.      Heart sounds: No murmur heard.  Pulmonary:      Effort: Pulmonary effort is normal. No respiratory distress.      Breath sounds: Normal breath sounds and air entry.   Abdominal:      General: Bowel sounds are normal.      Palpations: Abdomen is soft.   Musculoskeletal:      Right lower leg: No edema.      Left lower leg: No edema.   Lymphadenopathy:      Cervical: No cervical adenopathy.   Skin:     General: Skin is warm and dry.      Coloration: Skin is not jaundiced.      Findings: No erythema or rash.   Neurological:      General: No focal deficit present.      Mental Status: He is alert. Mental status is at baseline.   Psychiatric:         Mood and Affect: Mood normal.         Behavior: Behavior normal.         Thought Content: Thought content normal.        Assessment & Plan  Diagnoses and all orders for this visit:    Fever, unspecified fever cause  Fever, chills, decreased appetite x 24 hours.  No other symptoms at this time.  Will send out COVID test and update patient with results when available tomorrow.  Given printed prescription for Z-Hector to take if symptoms persist over the next 5 to 6 days or improve and then worsen again without fully resolving first.  Discussed that this is most likely viral in nature at this time and will not respond to antibiotics if started now. Continue supportive care with OTC medications as needed, rest, fluids. Let us know if symptoms persist or fail  to completely resolve to baseline with current treatment. Verbalized understanding.    -     Sars-CoV-2 PCR; Future  -     azithromycin (Zithromax) 250 mg tablet; Take 1 tablet (250 mg) by mouth once daily for 5 days. Take 2 tablets (500 mg) today, then 1 tablet (250 mg) daily days 2-5.    BMI 20.0-20.9, adult    Reviewed/discussed treatment options and health maintenance. Take medications as prescribed. We will contact you with the results of any ordered testing; please send a [employer] message or call the office if you do not hear from us. Follow up in the office with me as already discussed/scheduled and as needed. Return sooner if symptoms do not resolve as expected.     Cande Hough, APRN-CNP, DNP  Family Nurse Practitioner  [facility]

## 2025-06-12 ENCOUNTER — TELEPHONE (OUTPATIENT)
Dept: PRIMARY CARE | Facility: CLINIC | Age: 55
End: 2025-06-12

## 2025-06-12 DIAGNOSIS — R50.9 FEVER, UNSPECIFIED FEVER CAUSE: ICD-10-CM

## 2025-06-12 LAB — SARS-COV-2 RNA RESP QL NAA+PROBE: NOT DETECTED

## 2025-06-12 RX ORDER — AZITHROMYCIN 250 MG/1
250 TABLET, FILM COATED ORAL DAILY
Qty: 6 TABLET | Refills: 0 | Status: SHIPPED | OUTPATIENT
Start: 2025-06-12 | End: 2025-06-17

## 2025-06-24 ENCOUNTER — APPOINTMENT (OUTPATIENT)
Dept: GASTROENTEROLOGY | Facility: CLINIC | Age: 55
End: 2025-06-24
Payer: COMMERCIAL

## 2025-06-24 VITALS
OXYGEN SATURATION: 98 % | SYSTOLIC BLOOD PRESSURE: 129 MMHG | BODY MASS INDEX: 20.25 KG/M2 | HEART RATE: 69 BPM | DIASTOLIC BLOOD PRESSURE: 81 MMHG | WEIGHT: 129 LBS | HEIGHT: 67 IN

## 2025-06-24 DIAGNOSIS — K52.9 COLITIS: Primary | ICD-10-CM

## 2025-06-24 PROCEDURE — 1036F TOBACCO NON-USER: CPT | Performed by: INTERNAL MEDICINE

## 2025-06-24 PROCEDURE — 3008F BODY MASS INDEX DOCD: CPT | Performed by: INTERNAL MEDICINE

## 2025-06-24 PROCEDURE — 99213 OFFICE O/P EST LOW 20 MIN: CPT | Performed by: INTERNAL MEDICINE

## 2025-06-24 RX ORDER — ETRASIMOD 2 MG/1
2 TABLET, FILM COATED ORAL DAILY
COMMUNITY

## 2025-06-24 NOTE — ASSESSMENT & PLAN NOTE
Continue Velsipity as you are doing.  Labs as ordered.  Return to clinic in 4 months.  Get shingles vaccine.

## 2025-06-24 NOTE — PATIENT INSTRUCTIONS
Continue Velsipity as you are doing.  Labs as ordered.  Return to clinic in 4 months.  Get shingles vaccine.   We will schedule a colonoscopy next time.

## 2025-06-24 NOTE — PROGRESS NOTES
Gastroenterology Office Visit     History of Present Illness:   Since last OV in 4/25, patient has started Velsipity on 5/15/25-getting for free from pharmaceutical company; see below for complete results.      Has two loose stools daily.  Blood is much less.  Has not gotten shingles vaccine yet.      OV in 4/25:  Since last OV in 3/25, patient's Lialda was discontinued, and he was started on prednisone.     Never took the prednisone.  Bleeding has stopped with discontinuation of Lialda.  Has two loose stools daily.  No pain.     Denies h/o DM and uveitis.  Does not know if he ever had ECG.  Has not had shingles vaccine.        OV on 3/11/25:  Since last OV in 1/25, patient has had colonoscopy with bx; see below for complete results.    The abdominal pain and borborygmi have improve.  Has 3 BM's daily, vary from dane to normal to occasional diarrhea.  No bleeding.        OV in 11/23:  Mitch Felder is a 53 y.o. male who presents to GI clinic for follow up of transverse colon stricture.  Since last OV in 7/23 with Rsahida Lynch, patient has had surgery with Dr. Raymond in 8/23: Laparoscopic extended right  hemicolectomy, stapled side-to-side ileocolic anastomosis, and mobilization of splenic flexure.  Path showed CAC with fibrosis and stricture, but no cancer; pathologist favored IBD over ischemia.      He was having L-sided abdominal pain, diarrhea and bleeding when he was first seen by Ms. Lynch.  Pain, diarrhea, and bleeding have resolved after surgery.       OV in 7/23:  Patient following up to review test results.  This case was reviewed with Dr. Chapman, collaborating physician.  Patient initially was sent with Dr. Cardenas for a colonoscopy due to abnormal CT scan findings at [facility].  ->Colonoscopy 3/21/2023: Stricture in the descending colon. Biopsied. Patchy moderate inflammation found in the rectum and in the sigmoid colon. Biopsied. The distal rectum and anal verge are normal on retroflexion view. Repeat  colonoscopy in 4 months to evaluate response to therapy.  Pathology returned positive for active chronic inflammation.  -> He was placed on a prednisone taper shortly after his colonoscopy/pathology results.  -> Repeat colonoscopy With Dr. Chapman 7/10/2023: 10 mm polyp in the sigmoid colon, removed with cold snare. Stricture at 80 cm proximal to the anus. Tattooed. Pseudopolyps in the proximal sigmoid colon and in the descending colon. Nonbleeding IH.  Pathology unremarkable  ->Barium enema 7/21/2023: Severe stricture of the transverse colon around the splenic flexure. No evidence of bowel leak.     Patient denies constipation or diarrhea. No abdominal pain.  He has changed his diet. Eating more fiber and avoiding processed foods. Drinking more water throughout the day  Weight and appetite are stable per the patient.  He denies melena, hematochezia or hematemesis.  No family history of CRC or IBD    Review of Systems  Constitutional: denies fever/ chills, night sweats, wt loss and fatigue  Respiratory: denies SOB, GAGNON  CV: denies chest pain and LE edema  Neuro: denies weakness and difficulty walking      Past Medical History   has a past medical history of Other conditions influencing health status and Rash and other nonspecific skin eruption.     Problem List  [Problem List]    [Problem List]  Patient Active Problem List  Diagnosis    Abdominal pain    Acute sinusitis    Impetigo    Routine general medical examination at a health care facility    BMI 20.0-20.9, adult    Colon stricture (Multi)    Blood in stool    Abnormal CT scan, colon    Allergic rhinitis    Anemia    Asthma    Bloody stool    BRBPR (bright red blood per rectum)    Colitis    Cough    Dark stools    Diarrhea in adult patient    GERD (gastroesophageal reflux disease)    Injury, ankle    Intestinal infection    Rosacea    Sore throat             Past Surgical History  Past Surgical History:   Procedure Laterality Date    COLON SURGERY      RIGHT   "HEMICOLECTOMY    COLONOSCOPY             Social History   reports that he has never smoked. He has never used smokeless tobacco. He reports that he does not currently use alcohol. He reports that he does not use drugs.     Family History  family history includes Heart disease in his father; No Known Problems in his mother.       Allergies  No Known Allergies    Medications  Medications Ordered Prior to Encounter[1]              Objective   /81   Pulse 69   Ht 1.702 m (5' 7\")   Wt 58.5 kg (129 lb)   SpO2 98%   BMI 20.20 kg/m²       LABS     Component      Latest Ref Rng 3/29/2025   Albumin      3.4 - 5.0 g/dL 3.9    Alkaline Phosphatase      33 - 120 U/L 85    Total Protein      6.4 - 8.2 g/dL 7.3    AST      9 - 39 U/L 28    Bilirubin Total      0.0 - 1.2 mg/dL 0.5    ALT      10 - 52 U/L 13        Component      Latest Ref Rng 3/29/2025   WBC      4.4 - 11.3 x10*3/uL 7.8    nRBC      0.0 - 0.0 /100 WBCs 0.0    RBC      4.50 - 5.90 x10*6/uL 4.00 (L)    HEMOGLOBIN      13.5 - 17.5 g/dL 11.9 (L)    HEMATOCRIT      41.0 - 52.0 % 37.7 (L)    MCV      80 - 100 fL 94    MCH      26.0 - 34.0 pg 29.8    MCHC      32.0 - 36.0 g/dL 31.6 (L)    RED CELL DISTRIBUTION WIDTH      11.5 - 14.5 % 13.0    Platelets      150 - 450 x10*3/uL 356       Component      Latest Ref Rng 3/29/2025   Neutrophils Absolute      1.20 - 7.70 x10*3/uL 5.15    Immature Granulocytes Absolute, Automated      0.00 - 0.70 x10*3/uL 0.02    Lymphocytes Absolute      1.20 - 4.80 x10*3/uL 1.39        Component      Latest Ref Rng 11/16/2023 1/17/2024 4/28/2025   Calprotectin, Stool      <=49 ug/g 117 (H)  335 (H)     CALPROTECTIN, STOOL      mcg/g   94           Radiology  CT A/P 1/25: Stricture is apparent in the colon just distal to the ileocolic  anastomosis over a length of about 2 cm.  No associated mass.      Endoscopy  Flex sig 11/23: stool prevented me from advancing to the anastomosis; rectosigmoid colon normal; no colitis on " path    Colonoscopy :  normal mucosa in descending colon; mild inflammation in rectum and sigmoid; no stricture    FINAL DIAGNOSIS   A. TERMINAL ILEUM, BIOPSY:  - Small intestinal mucosa with no significant abnormality.     B. ILEOCOLONIC ANASTOMOSIS, BIOPSY:  - Small intestinal mucosa with mild active chronic enteritis.  - Negative for granulomas and dysplasia.     C. DESCENDING COLON, BIOPSY:  - Colonic mucosa with no significant abnormality.  - Negative for active inflammation, granulomas and dysplasia.     D. SIGMOID COLON, BIOPSY:  - Focally active chronic colitis.  - Negative for granulomas and dysplasia.     E. RECTUM, BIOPSY:  - Mildly active chronic colitis.  - Negative for granulomas and dysplasia.       Assessment/Plan   Mitch Felder is a 55 y.o. male who presents to GI clinic for colonic stricture, s/p lap extended R hemicolectomy in , and possible IBD as the etiology.  The most recent scope shows CAC, but no SB disease.  It is possible that the prior colonic stricture was due to Crohn's.  He had a paradoxical reaction/ intolerance to 5-ASA with increased bleeding.  This has resolved with discontinuation.  Started Velsipity in . Improved.     Colitis  Continue Velsipity as you are doing.  Labs as ordered.  Return to clinic in 4 months.  Get shingles vaccine.        Plan for colonoscopy for mucosal healing near the end of .     Joesph Chapman MD          [1]   Current Outpatient Medications on File Prior to Visit   Medication Sig Dispense Refill    etrasimod (Velsipity) 2 mg tablet Take 2 mg by mouth once daily.      [] azithromycin (Zithromax) 250 mg tablet Take 1 tablet (250 mg) by mouth once daily for 5 days. Take 2 tablets (500 mg) today, then 1 tablet (250 mg) daily days 2-5. 6 tablet 0    doxycycline (Monodox) 100 mg capsule Take 1 capsule (100 mg) by mouth once daily.       No current facility-administered medications on file prior to visit.

## 2025-06-26 LAB
25(OH)D3+25(OH)D2 SERPL-MCNC: 21 NG/ML (ref 30–100)
ALBUMIN SERPL-MCNC: 3.7 G/DL (ref 3.6–5.1)
ALBUMIN/GLOB SERPL: 1.5 (CALC) (ref 1–2.5)
ALP SERPL-CCNC: 87 U/L (ref 35–144)
ALT SERPL-CCNC: 52 U/L (ref 9–46)
AST SERPL-CCNC: 57 U/L (ref 10–35)
BASOPHILS # BLD AUTO: 21 CELLS/UL (ref 0–200)
BASOPHILS NFR BLD AUTO: 0.4 %
BILIRUB DIRECT SERPL-MCNC: 0.1 MG/DL
BILIRUB INDIRECT SERPL-MCNC: 0.4 MG/DL (CALC) (ref 0.2–1.2)
BILIRUB SERPL-MCNC: 0.5 MG/DL (ref 0.2–1.2)
EOSINOPHIL # BLD AUTO: 88 CELLS/UL (ref 15–500)
EOSINOPHIL NFR BLD AUTO: 1.7 %
ERYTHROCYTE [DISTWIDTH] IN BLOOD BY AUTOMATED COUNT: 12.8 % (ref 11–15)
GLOBULIN SER CALC-MCNC: 2.4 G/DL (CALC) (ref 1.9–3.7)
HCT VFR BLD AUTO: 36.3 % (ref 38.5–50)
HGB BLD-MCNC: 11.5 G/DL (ref 13.2–17.1)
LYMPHOCYTES # BLD AUTO: 463 CELLS/UL (ref 850–3900)
LYMPHOCYTES NFR BLD AUTO: 8.9 %
MCH RBC QN AUTO: 29.7 PG (ref 27–33)
MCHC RBC AUTO-ENTMCNC: 31.7 G/DL (ref 32–36)
MCV RBC AUTO: 93.8 FL (ref 80–100)
MONOCYTES # BLD AUTO: 759 CELLS/UL (ref 200–950)
MONOCYTES NFR BLD AUTO: 14.6 %
NEUTROPHILS # BLD AUTO: 3869 CELLS/UL (ref 1500–7800)
NEUTROPHILS NFR BLD AUTO: 74.4 %
PLATELET # BLD AUTO: 394 THOUSAND/UL (ref 140–400)
PMV BLD REES-ECKER: 9.6 FL (ref 7.5–12.5)
PROT SERPL-MCNC: 6.1 G/DL (ref 6.1–8.1)
RBC # BLD AUTO: 3.87 MILLION/UL (ref 4.2–5.8)
WBC # BLD AUTO: 5.2 THOUSAND/UL (ref 3.8–10.8)

## 2025-06-27 DIAGNOSIS — Z79.899 HIGH RISK MEDICATION USE: ICD-10-CM

## 2025-06-27 DIAGNOSIS — K52.9 COLITIS: ICD-10-CM

## 2025-06-27 DIAGNOSIS — E55.9 VITAMIN D DEFICIENCY: Primary | ICD-10-CM

## 2025-06-27 RX ORDER — ASPIRIN 325 MG
1.25 TABLET, DELAYED RELEASE (ENTERIC COATED) ORAL
Qty: 8 CAPSULE | Refills: 0 | Status: SHIPPED | OUTPATIENT
Start: 2025-06-29 | End: 2026-06-29

## 2025-06-27 NOTE — PROGRESS NOTES
Transaminases increased to 50s, lymphocytes decreased from 1300 -> 463 after being on velsipity x 5 wks.  Will have patient repeat in 2 weeks.  Vit D low.  Patient notified.     Medication e-scripted to pharmacy.    Joesph Chapman MD

## 2025-07-11 DIAGNOSIS — Z79.899 HIGH RISK MEDICATION USE: ICD-10-CM

## 2025-07-11 DIAGNOSIS — K52.9 COLITIS: ICD-10-CM

## 2025-07-22 ENCOUNTER — APPOINTMENT (OUTPATIENT)
Dept: GASTROENTEROLOGY | Facility: CLINIC | Age: 55
End: 2025-07-22
Payer: COMMERCIAL

## 2025-07-25 DIAGNOSIS — K52.9 COLITIS: ICD-10-CM

## 2025-07-25 DIAGNOSIS — Z79.899 HIGH RISK MEDICATION USE: ICD-10-CM

## 2025-08-08 DIAGNOSIS — Z79.899 HIGH RISK MEDICATION USE: ICD-10-CM

## 2025-08-08 DIAGNOSIS — K52.9 COLITIS: ICD-10-CM

## 2025-08-22 DIAGNOSIS — Z79.899 HIGH RISK MEDICATION USE: ICD-10-CM

## 2025-08-22 DIAGNOSIS — K52.9 COLITIS: ICD-10-CM

## 2025-09-03 ENCOUNTER — OFFICE VISIT (OUTPATIENT)
Dept: PRIMARY CARE | Facility: CLINIC | Age: 55
End: 2025-09-03
Payer: COMMERCIAL

## 2025-09-03 VITALS
WEIGHT: 132 LBS | SYSTOLIC BLOOD PRESSURE: 100 MMHG | TEMPERATURE: 99.6 F | HEIGHT: 67 IN | DIASTOLIC BLOOD PRESSURE: 80 MMHG | OXYGEN SATURATION: 99 % | BODY MASS INDEX: 20.72 KG/M2 | HEART RATE: 61 BPM

## 2025-09-03 DIAGNOSIS — J30.2 SEASONAL ALLERGIES: Primary | ICD-10-CM

## 2025-09-03 DIAGNOSIS — J01.00 ACUTE MAXILLARY SINUSITIS, RECURRENCE NOT SPECIFIED: ICD-10-CM

## 2025-09-03 PROBLEM — J45.909 ASTHMA: Status: RESOLVED | Noted: 2025-01-29 | Resolved: 2025-09-03

## 2025-09-03 PROCEDURE — 99213 OFFICE O/P EST LOW 20 MIN: CPT | Performed by: FAMILY MEDICINE

## 2025-09-03 PROCEDURE — 3008F BODY MASS INDEX DOCD: CPT | Performed by: FAMILY MEDICINE

## 2025-09-03 PROCEDURE — 1036F TOBACCO NON-USER: CPT | Performed by: FAMILY MEDICINE

## 2025-09-03 RX ORDER — FEXOFENADINE HCL 180 MG/1
180 TABLET ORAL DAILY
Qty: 30 TABLET | Refills: 5 | Status: SHIPPED | OUTPATIENT
Start: 2025-09-03 | End: 2026-09-03

## 2025-09-03 RX ORDER — CEFDINIR 300 MG/1
600 CAPSULE ORAL DAILY
Qty: 20 CAPSULE | Refills: 0 | Status: SHIPPED | OUTPATIENT
Start: 2025-09-03 | End: 2025-09-13

## 2025-09-03 ASSESSMENT — PATIENT HEALTH QUESTIONNAIRE - PHQ9
SUM OF ALL RESPONSES TO PHQ9 QUESTIONS 1 AND 2: 0
2. FEELING DOWN, DEPRESSED OR HOPELESS: NOT AT ALL
1. LITTLE INTEREST OR PLEASURE IN DOING THINGS: NOT AT ALL

## 2025-10-21 ENCOUNTER — APPOINTMENT (OUTPATIENT)
Dept: GASTROENTEROLOGY | Facility: CLINIC | Age: 55
End: 2025-10-21
Payer: COMMERCIAL